# Patient Record
Sex: FEMALE | Race: WHITE | NOT HISPANIC OR LATINO | Employment: OTHER | URBAN - METROPOLITAN AREA
[De-identification: names, ages, dates, MRNs, and addresses within clinical notes are randomized per-mention and may not be internally consistent; named-entity substitution may affect disease eponyms.]

---

## 2018-06-19 ENCOUNTER — HOSPITAL ENCOUNTER (OUTPATIENT)
Dept: FAMILY MEDICINE CLINIC | Facility: CLINIC | Age: 63
Setting detail: SPECIMEN
Discharge: HOME | End: 2018-06-19
Attending: INTERNAL MEDICINE | Admitting: INTERNAL MEDICINE

## 2018-06-19 LAB
BACTERIA SPEC AEROBE CULT: NORMAL
Lab: NORMAL
MICRO REPORT STATUS: NORMAL
SPECIMEN SOURCE: NORMAL

## 2019-01-04 ENCOUNTER — HOSPITAL ENCOUNTER (OUTPATIENT)
Dept: FAMILY MEDICINE CLINIC | Facility: CLINIC | Age: 64
Setting detail: SPECIMEN
Discharge: HOME | End: 2019-01-04
Attending: INTERNAL MEDICINE | Admitting: INTERNAL MEDICINE

## 2019-10-18 ENCOUNTER — OFFICE VISIT (OUTPATIENT)
Dept: FAMILY MEDICINE CLINIC | Facility: CLINIC | Age: 64
End: 2019-10-18

## 2019-10-18 VITALS
HEIGHT: 61 IN | RESPIRATION RATE: 16 BRPM | TEMPERATURE: 100 F | SYSTOLIC BLOOD PRESSURE: 118 MMHG | BODY MASS INDEX: 28.66 KG/M2 | HEART RATE: 86 BPM | WEIGHT: 151.8 LBS | OXYGEN SATURATION: 100 % | DIASTOLIC BLOOD PRESSURE: 66 MMHG

## 2019-10-18 DIAGNOSIS — R05.9 COUGH: ICD-10-CM

## 2019-10-18 DIAGNOSIS — R63.5 WEIGHT GAIN: ICD-10-CM

## 2019-10-18 DIAGNOSIS — M77.9 BONE SPUR: ICD-10-CM

## 2019-10-18 DIAGNOSIS — Z23 NEED FOR IMMUNIZATION AGAINST INFLUENZA: Primary | ICD-10-CM

## 2019-10-18 PROCEDURE — 99214 OFFICE O/P EST MOD 30 MIN: CPT | Performed by: INTERNAL MEDICINE

## 2019-10-18 PROCEDURE — 90471 IMMUNIZATION ADMIN: CPT | Performed by: INTERNAL MEDICINE

## 2019-10-18 PROCEDURE — 90674 CCIIV4 VAC NO PRSV 0.5 ML IM: CPT | Performed by: INTERNAL MEDICINE

## 2019-10-18 NOTE — PROGRESS NOTES
"Rooming Tab(CC,VS,Pt Hx,Fall Screen)  Chief Complaint   Patient presents with   • Foot Pain   • Hearing Loss       Subjective   Pt here for several things  Had URI last month- still with a few coughing here and there- on mucinex. And helping  Needs flu vaccine today  3. Has hearing issues- seen at [facility] and told left ear- thinks may be wax- however  Has sclerosis too and needs hearing test  4. Feet hurt when starts to walk again-   I have reviewed and updated her medications, medical history and problem list during today's office visit.     Patient Care Team:  Cherry Yadav MD as PCP - General (Internal Medicine)    Problem List Tab  Medications Tab  Synopsis Tab  Chart Review Tab  Care Everywhere Tab  Immunizations Tab  Patient History Tab    Social History     Tobacco Use   • Smoking status: Never Smoker   • Smokeless tobacco: Never Used   Substance Use Topics   • Alcohol use: Not on file       Review of Systems   HENT: Positive for hearing loss and sinus pressure.    Respiratory: Positive for cough.    Cardiovascular: Negative for palpitations.   Gastrointestinal: Negative for abdominal distention.   Musculoskeletal: Positive for gait problem.   Neurological: Positive for weakness.       Objective     Rooming Tab(CC,VS,Pt Hx,Fall Screen)  /66 (BP Location: Right arm, Patient Position: Sitting, Cuff Size: Adult)   Pulse 86   Temp 100 °F (37.8 °C) (Oral)   Resp 16   Ht 154.9 cm (61\")   Wt 68.9 kg (151 lb 12.8 oz)   SpO2 100%   BMI 28.68 kg/m²     Body mass index is 28.68 kg/m².    Physical Exam   Constitutional: She is oriented to person, place, and time. She appears well-developed and well-nourished.   HENT:   Head: Normocephalic.   PND   Cardiovascular: Normal rate and regular rhythm.   No murmur heard.  Pulmonary/Chest: Effort normal and breath sounds normal. No respiratory distress.   Musculoskeletal:    Ball bone spur   Neurological: She is alert and oriented to person, place, and " time.   Nursing note and vitals reviewed.       Statin Choice Calculator  Data Reviewed:                   Assessment/Plan   Order Review Tab  Health Maintenance Tab  Patient Plan/Order Tab  Diagnoses and all orders for this visit:    1. Need for immunization against influenza (Primary)  -     Flucelvax Quad=>4Years (PFS); Standing  -     Flucelvax Quad=>4Years (PFS)    2. Weight gain  Comments:  work on increased steps    3. Bone spur  Comments:  needs good shoes with good inserts at all times    4. Cough  Comments:  supportive care        Wrapup Tab  Return in about 1 year (around 10/18/2020), or  for 1/2020, for Annual physical.       They were informed of the diagnosis and treatment plan and directed to f/u for any further problems or concerns.

## 2020-09-09 ENCOUNTER — TELEPHONE (OUTPATIENT)
Dept: FAMILY MEDICINE CLINIC | Facility: CLINIC | Age: 65
End: 2020-09-09

## 2020-09-09 NOTE — TELEPHONE ENCOUNTER
PATIENT IS CALLING IN SHE IS TRYING TO GET SOCIAL SECURITY AND THEY ARE TELLING HER SHE NEEDS TO GET HER MEDICAL RECORDS IN A SEALED ENVELOPE SO SHE CAN TAKE IT TO THEM.      SHE WOULD LIKE CALLBACK TO FIND OUT HOW TO GO ABOUT DOING THIS.    CALLBACK NUMBER IS:  [phone]

## 2020-09-17 NOTE — TELEPHONE ENCOUNTER
Patient returned call to massimo to give more information. attempted to warm transfer and was unsuccessful     Please advise     [phone]

## 2020-09-17 NOTE — TELEPHONE ENCOUNTER
NEEDS AN OFFICIAL COPY OF A MEDICAL RECORD PLACED IN A SEALED ENVELOPE.  WOULD LIKE TO  TOMORROW.  CALL WHEN READY

## 2020-09-18 NOTE — TELEPHONE ENCOUNTER
Records printed.   Release ID: [id number]    Ready for .  Placed up front.  Patient notified they are ready.

## 2020-10-07 ENCOUNTER — LAB (OUTPATIENT)
Dept: FAMILY MEDICINE CLINIC | Facility: CLINIC | Age: 65
End: 2020-10-07

## 2020-10-07 ENCOUNTER — OFFICE VISIT (OUTPATIENT)
Dept: FAMILY MEDICINE CLINIC | Facility: CLINIC | Age: 65
End: 2020-10-07

## 2020-10-07 VITALS
DIASTOLIC BLOOD PRESSURE: 80 MMHG | WEIGHT: 147 LBS | BODY MASS INDEX: 27.75 KG/M2 | OXYGEN SATURATION: 98 % | HEART RATE: 80 BPM | HEIGHT: 61 IN | TEMPERATURE: 97.3 F | SYSTOLIC BLOOD PRESSURE: 124 MMHG

## 2020-10-07 DIAGNOSIS — E55.9 VITAMIN D DEFICIENCY: ICD-10-CM

## 2020-10-07 DIAGNOSIS — Z00.00 ENCOUNTER FOR SCREENING AND PREVENTATIVE CARE: ICD-10-CM

## 2020-10-07 DIAGNOSIS — H61.21 IMPACTED CERUMEN OF RIGHT EAR: ICD-10-CM

## 2020-10-07 DIAGNOSIS — Z12.31 ENCOUNTER FOR SCREENING MAMMOGRAM FOR MALIGNANT NEOPLASM OF BREAST: Primary | ICD-10-CM

## 2020-10-07 DIAGNOSIS — Z78.0 MENOPAUSE: ICD-10-CM

## 2020-10-07 DIAGNOSIS — Z12.11 SCREENING FOR COLON CANCER: ICD-10-CM

## 2020-10-07 PROCEDURE — 80053 COMPREHEN METABOLIC PANEL: CPT | Performed by: INTERNAL MEDICINE

## 2020-10-07 PROCEDURE — 84443 ASSAY THYROID STIM HORMONE: CPT | Performed by: INTERNAL MEDICINE

## 2020-10-07 PROCEDURE — 69210 REMOVE IMPACTED EAR WAX UNI: CPT | Performed by: INTERNAL MEDICINE

## 2020-10-07 PROCEDURE — 90686 IIV4 VACC NO PRSV 0.5 ML IM: CPT | Performed by: INTERNAL MEDICINE

## 2020-10-07 PROCEDURE — 82306 VITAMIN D 25 HYDROXY: CPT | Performed by: INTERNAL MEDICINE

## 2020-10-07 PROCEDURE — 80061 LIPID PANEL: CPT | Performed by: INTERNAL MEDICINE

## 2020-10-07 PROCEDURE — 99396 PREV VISIT EST AGE 40-64: CPT | Performed by: INTERNAL MEDICINE

## 2020-10-07 PROCEDURE — 85025 COMPLETE CBC W/AUTO DIFF WBC: CPT | Performed by: INTERNAL MEDICINE

## 2020-10-07 PROCEDURE — 90471 IMMUNIZATION ADMIN: CPT | Performed by: INTERNAL MEDICINE

## 2020-10-07 NOTE — PROGRESS NOTES
"Rooming Tab(CC,VS,Pt Hx,Fall Screen)  Chief Complaint   Patient presents with   • Annual Exam       Subjective   Pt here for annual exam. Trying to lose weight last 3 months down 5 lbs. Still caring for parents- (only mom now as dad  in March) down for 2 weeks then home for 2 weeks.   Mom doing better- blind and hard to get around. Brother now executor to get estate under control.    Sleeping fair- at home. No chest pain  No difficulty breathing, no GERD   joints and back ok-  vacuum  very heavy so took in back   I have reviewed and updated her medications, medical history and problem list during today's office visit.     Patient Care Team:  Cherry Yadav MD as PCP - General (Internal Medicine)    Problem List Tab  Medications Tab  Synopsis Tab  Chart Review Tab  Care Everywhere Tab  Immunizations Tab  Patient History Tab    Social History     Tobacco Use   • Smoking status: Never Smoker   • Smokeless tobacco: Never Used   Substance Use Topics   • Alcohol use: Not on file       Review of Systems   Constitutional: Positive for unexpected weight gain. Negative for fatigue.   Cardiovascular: Negative for chest pain.   Musculoskeletal: Positive for arthralgias.   Psychiatric/Behavioral: Positive for sleep disturbance. Negative for stress.       Objective     Rooming Tab(CC,VS,Pt Hx,Fall Screen)  /80 (BP Location: Left arm, Patient Position: Sitting, Cuff Size: Adult)   Pulse 80   Temp 97.3 °F (36.3 °C) (Temporal)   Ht 154.9 cm (61\")   Wt 66.7 kg (147 lb)   SpO2 98%   BMI 27.78 kg/m²     Body mass index is 27.78 kg/m².    Physical Exam  Vitals signs and nursing note reviewed.   Constitutional:       Appearance: Normal appearance. She is well-developed. She is obese.   HENT:      Head: Normocephalic and atraumatic.      Right Ear: Tympanic membrane normal.      Left Ear: Tympanic membrane normal.   Eyes:      Pupils: Pupils are equal, round, and reactive to light.   Neck:      " Musculoskeletal: Normal range of motion and neck supple.   Cardiovascular:      Rate and Rhythm: Normal rate and regular rhythm.      Heart sounds: No murmur.   Pulmonary:      Effort: Pulmonary effort is normal.      Breath sounds: Normal breath sounds.   Chest:      Breasts:         Right: No inverted nipple or mass.         Left: No inverted nipple or mass.   Abdominal:      General: Bowel sounds are normal. There is no distension.      Palpations: Abdomen is soft.   Skin:     Capillary Refill: Capillary refill takes less than 2 seconds.   Neurological:      Mental Status: She is alert and oriented to person, place, and time.          Statin Choice Calculator  Data Reviewed:     .Ear Cerumen Removal    Date/Time: 10/7/2020 2:14 PM  Performed by: Cherry Yadav MD  Authorized by: Cherry Yadav MD     Anesthesia:  Local Anesthetic: none  Location details: right ear  Patient tolerance: patient tolerated the procedure well with no immediate complications  Procedure type: instrumentation and irrigation   Sedation:  Patient sedated: no                  Lab Results   Component Value Date    BUN 19 10/07/2020    CREATININE 0.65 10/07/2020    EGFRIFNONA 92 10/07/2020     10/07/2020    K 4.1 10/07/2020     10/07/2020    CALCIUM 9.7 10/07/2020    ALBUMIN 4.40 10/07/2020    BILITOT 0.4 10/07/2020    ALKPHOS 69 10/07/2020    AST 18 10/07/2020    ALT 13 10/07/2020    TRIG 80 10/07/2020    HDL 56 10/07/2020    VLDL 16 10/07/2020     (H) 10/07/2020    LDLHDL 2.54 10/07/2020    WBC 6.75 10/07/2020    RBC 4.57 10/07/2020    HCT 40.5 10/07/2020    MCV 88.6 10/07/2020    MCH 29.1 10/07/2020    TSH 2.270 10/07/2020    SANH85HW 57.0 10/07/2020      Assessment/Plan   Order Review Tab  Health Maintenance Tab  Patient Plan/Order Tab  Diagnoses and all orders for this visit:    1. Encounter for screening mammogram for malignant neoplasm of breast (Primary)  Comments:  discussed all  recommendations  Orders:  -     Mammo Screening Digital Tomosynthesis Bilateral With CAD; Future    2. Screening for colon cancer  -     Ambulatory Referral For Screening Colonoscopy    3. Menopause  -     DEXA Bone Density Axial; Future    4. Encounter for screening and preventative care  Comments:  discussed all recommendations  Orders:  -     TSH  -     Lipid Panel  -     Comprehensive Metabolic Panel  -     CBC Auto Differential    5. Vitamin D deficiency  -     Vitamin D 25 Hydroxy    6. Impacted cerumen of right ear  Comments:  right ear   Orders:  -     Ear Cerumen Removal    Other orders  -     FluLaval Quad >6 Months ([id number])        Wrapup Tab  Return in about 6 months (around 4/7/2021), or if symptoms worsen or fail to improve.       During this visit for their annual exam, we reviewed their personal history, social history and family history.  We went over their medications and all the recommended health maintenence items for their age group. They were given the opportunity to ask questions and discuss other concerns.

## 2020-10-08 LAB
25(OH)D3 SERPL-MCNC: 57 NG/ML (ref 30–100)
ALBUMIN SERPL-MCNC: 4.4 G/DL (ref 3.5–5.2)
ALBUMIN/GLOB SERPL: 1.5 G/DL
ALP SERPL-CCNC: 69 U/L (ref 39–117)
ALT SERPL W P-5'-P-CCNC: 13 U/L (ref 1–33)
ANION GAP SERPL CALCULATED.3IONS-SCNC: 11.4 MMOL/L (ref 5–15)
AST SERPL-CCNC: 18 U/L (ref 1–32)
BASOPHILS # BLD AUTO: 0.04 10*3/MM3 (ref 0–0.2)
BASOPHILS NFR BLD AUTO: 0.6 % (ref 0–1.5)
BILIRUB SERPL-MCNC: 0.4 MG/DL (ref 0–1.2)
BUN SERPL-MCNC: 19 MG/DL (ref 8–23)
BUN/CREAT SERPL: 29.2 (ref 7–25)
CALCIUM SPEC-SCNC: 9.7 MG/DL (ref 8.6–10.5)
CHLORIDE SERPL-SCNC: 104 MMOL/L (ref 98–107)
CHOLEST SERPL-MCNC: 214 MG/DL (ref 0–200)
CO2 SERPL-SCNC: 26.6 MMOL/L (ref 22–29)
CREAT SERPL-MCNC: 0.65 MG/DL (ref 0.57–1)
DEPRECATED RDW RBC AUTO: 41.7 FL (ref 37–54)
EOSINOPHIL # BLD AUTO: 0.1 10*3/MM3 (ref 0–0.4)
EOSINOPHIL NFR BLD AUTO: 1.5 % (ref 0.3–6.2)
ERYTHROCYTE [DISTWIDTH] IN BLOOD BY AUTOMATED COUNT: 13 % (ref 12.3–15.4)
GFR SERPL CREATININE-BSD FRML MDRD: 92 ML/MIN/1.73
GLOBULIN UR ELPH-MCNC: 3 GM/DL
GLUCOSE SERPL-MCNC: 91 MG/DL (ref 65–99)
HCT VFR BLD AUTO: 40.5 % (ref 34–46.6)
HDLC SERPL-MCNC: 56 MG/DL (ref 40–60)
HGB BLD-MCNC: 13.3 G/DL (ref 12–15.9)
IMM GRANULOCYTES # BLD AUTO: 0.01 10*3/MM3 (ref 0–0.05)
IMM GRANULOCYTES NFR BLD AUTO: 0.1 % (ref 0–0.5)
LDLC SERPL CALC-MCNC: 142 MG/DL (ref 0–100)
LDLC/HDLC SERPL: 2.54 {RATIO}
LYMPHOCYTES # BLD AUTO: 2.74 10*3/MM3 (ref 0.7–3.1)
LYMPHOCYTES NFR BLD AUTO: 40.6 % (ref 19.6–45.3)
MCH RBC QN AUTO: 29.1 PG (ref 26.6–33)
MCHC RBC AUTO-ENTMCNC: 32.8 G/DL (ref 31.5–35.7)
MCV RBC AUTO: 88.6 FL (ref 79–97)
MONOCYTES # BLD AUTO: 0.37 10*3/MM3 (ref 0.1–0.9)
MONOCYTES NFR BLD AUTO: 5.5 % (ref 5–12)
NEUTROPHILS NFR BLD AUTO: 3.49 10*3/MM3 (ref 1.7–7)
NEUTROPHILS NFR BLD AUTO: 51.7 % (ref 42.7–76)
NRBC BLD AUTO-RTO: 0 /100 WBC (ref 0–0.2)
PLATELET # BLD AUTO: 262 10*3/MM3 (ref 140–450)
PMV BLD AUTO: 10.6 FL (ref 6–12)
POTASSIUM SERPL-SCNC: 4.1 MMOL/L (ref 3.5–5.2)
PROT SERPL-MCNC: 7.4 G/DL (ref 6–8.5)
RBC # BLD AUTO: 4.57 10*6/MM3 (ref 3.77–5.28)
SODIUM SERPL-SCNC: 142 MMOL/L (ref 136–145)
TRIGL SERPL-MCNC: 80 MG/DL (ref 0–150)
TSH SERPL DL<=0.05 MIU/L-ACNC: 2.27 UIU/ML (ref 0.27–4.2)
VLDLC SERPL-MCNC: 16 MG/DL (ref 5–40)
WBC # BLD AUTO: 6.75 10*3/MM3 (ref 3.4–10.8)

## 2020-10-09 NOTE — PROGRESS NOTES
Please call the patient regarding her abnormal result. Has borderline cholesterol everything else is great- just watch diet when going back and forth from mom's to her house

## 2020-10-10 PROBLEM — Z12.31 ENCOUNTER FOR SCREENING MAMMOGRAM FOR MALIGNANT NEOPLASM OF BREAST: Status: ACTIVE | Noted: 2020-10-10

## 2020-10-10 PROBLEM — Z00.00 ENCOUNTER FOR SCREENING AND PREVENTATIVE CARE: Status: ACTIVE | Noted: 2020-10-10

## 2020-10-10 PROBLEM — Z12.11 SCREENING FOR COLON CANCER: Status: ACTIVE | Noted: 2020-10-10

## 2020-10-10 PROBLEM — H61.21 IMPACTED CERUMEN OF RIGHT EAR: Status: ACTIVE | Noted: 2020-10-10

## 2020-10-10 PROBLEM — Z78.0 MENOPAUSE: Status: ACTIVE | Noted: 2020-10-10

## 2020-10-10 PROBLEM — E55.9 VITAMIN D DEFICIENCY: Status: ACTIVE | Noted: 2020-10-10

## 2020-12-09 ENCOUNTER — ON CAMPUS - OUTPATIENT (AMBULATORY)
Dept: URBAN - METROPOLITAN AREA HOSPITAL 2 | Facility: HOSPITAL | Age: 65
End: 2020-12-09

## 2020-12-09 VITALS
SYSTOLIC BLOOD PRESSURE: 66 MMHG | SYSTOLIC BLOOD PRESSURE: 140 MMHG | HEIGHT: 61 IN | OXYGEN SATURATION: 98 % | TEMPERATURE: 94.2 F | DIASTOLIC BLOOD PRESSURE: 59 MMHG | DIASTOLIC BLOOD PRESSURE: 56 MMHG | DIASTOLIC BLOOD PRESSURE: 79 MMHG | HEART RATE: 74 BPM | OXYGEN SATURATION: 100 % | HEART RATE: 70 BPM | SYSTOLIC BLOOD PRESSURE: 141 MMHG | SYSTOLIC BLOOD PRESSURE: 116 MMHG | RESPIRATION RATE: 16 BRPM | SYSTOLIC BLOOD PRESSURE: 89 MMHG | SYSTOLIC BLOOD PRESSURE: 103 MMHG | DIASTOLIC BLOOD PRESSURE: 73 MMHG | HEART RATE: 78 BPM | DIASTOLIC BLOOD PRESSURE: 42 MMHG | DIASTOLIC BLOOD PRESSURE: 70 MMHG | SYSTOLIC BLOOD PRESSURE: 126 MMHG | HEART RATE: 72 BPM | HEART RATE: 67 BPM | RESPIRATION RATE: 18 BRPM | HEART RATE: 69 BPM | RESPIRATION RATE: 17 BRPM | OXYGEN SATURATION: 99 % | DIASTOLIC BLOOD PRESSURE: 60 MMHG | DIASTOLIC BLOOD PRESSURE: 69 MMHG | DIASTOLIC BLOOD PRESSURE: 51 MMHG | HEART RATE: 80 BPM | WEIGHT: 147 LBS | SYSTOLIC BLOOD PRESSURE: 142 MMHG | HEART RATE: 76 BPM | HEART RATE: 73 BPM

## 2020-12-09 DIAGNOSIS — K63.5 POLYP OF COLON: ICD-10-CM

## 2020-12-09 DIAGNOSIS — Z12.11 ENCOUNTER FOR SCREENING FOR MALIGNANT NEOPLASM OF COLON: ICD-10-CM

## 2020-12-09 LAB
GI HISTOLOGY: A. UNSPECIFIED: (no result)
GI HISTOLOGY: PDF REPORT: (no result)

## 2020-12-09 PROCEDURE — 45385 COLONOSCOPY W/LESION REMOVAL: CPT | Mod: 33 | Performed by: INTERNAL MEDICINE

## 2021-05-13 ENCOUNTER — TELEPHONE (OUTPATIENT)
Dept: FAMILY MEDICINE CLINIC | Facility: CLINIC | Age: 66
End: 2021-05-13

## 2021-05-13 NOTE — TELEPHONE ENCOUNTER
Caller: Nya Hardin    Relationship: Self    Best call back number: [phone]    What is the medical concern/diagnosis: HEARING PROBLEMS    What specialty or service is being requested: ENT      Any additional details: NYA STATED THIS WAS DISCUSSED AT A PREVIOUS APPOINTMENT BUT SHE NEVER SCHEDULED. PATIENT REQUESTED A CALL TO ADVISE.

## 2021-05-14 NOTE — TELEPHONE ENCOUNTER
She was supposed to f/u with [facility] in April for 6 month f/u. She needs to be scheduled and we will put the ENT referral in

## 2021-05-14 NOTE — TELEPHONE ENCOUNTER
PATIENT IS ALREADY SCHEDULED 6/22/21 SO I LEFT HER A VM LETTING HER KNOW THEY CAN DISCUSS THE REFERRAL AT THAT APPT

## 2021-06-18 ENCOUNTER — TELEPHONE (OUTPATIENT)
Dept: FAMILY MEDICINE CLINIC | Facility: CLINIC | Age: 66
End: 2021-06-18

## 2021-06-18 NOTE — TELEPHONE ENCOUNTER
PATIENT WOULD LIKE TO KNOW IF SHE WILL BE COMPLETING BLOOD WORK AT HER 6/22 APPOINTMENT AND IF SO SHOULD SHE FAST.    PLEASE ADVISE  [phone]

## 2021-06-22 ENCOUNTER — LAB (OUTPATIENT)
Dept: FAMILY MEDICINE CLINIC | Facility: CLINIC | Age: 66
End: 2021-06-22

## 2021-06-22 ENCOUNTER — OFFICE VISIT (OUTPATIENT)
Dept: FAMILY MEDICINE CLINIC | Facility: CLINIC | Age: 66
End: 2021-06-22

## 2021-06-22 VITALS
HEIGHT: 61 IN | SYSTOLIC BLOOD PRESSURE: 122 MMHG | WEIGHT: 148.2 LBS | DIASTOLIC BLOOD PRESSURE: 71 MMHG | HEART RATE: 71 BPM | RESPIRATION RATE: 10 BRPM | BODY MASS INDEX: 27.98 KG/M2 | OXYGEN SATURATION: 97 %

## 2021-06-22 DIAGNOSIS — H80.90 OTOSCLEROSIS, UNSPECIFIED LATERALITY: Primary | ICD-10-CM

## 2021-06-22 DIAGNOSIS — E78.41 ELEVATED LIPOPROTEIN(A): ICD-10-CM

## 2021-06-22 DIAGNOSIS — E55.9 VITAMIN D DEFICIENCY: ICD-10-CM

## 2021-06-22 PROBLEM — R05.9 COUGH: Status: RESOLVED | Noted: 2019-10-18 | Resolved: 2021-06-22

## 2021-06-22 PROBLEM — H61.21 IMPACTED CERUMEN OF RIGHT EAR: Status: RESOLVED | Noted: 2020-10-10 | Resolved: 2021-06-22

## 2021-06-22 PROCEDURE — 99213 OFFICE O/P EST LOW 20 MIN: CPT | Performed by: INTERNAL MEDICINE

## 2021-06-22 PROCEDURE — 36415 COLL VENOUS BLD VENIPUNCTURE: CPT | Performed by: INTERNAL MEDICINE

## 2021-06-22 PROCEDURE — 80061 LIPID PANEL: CPT | Performed by: INTERNAL MEDICINE

## 2021-06-22 PROCEDURE — 80053 COMPREHEN METABOLIC PANEL: CPT | Performed by: INTERNAL MEDICINE

## 2021-06-22 PROCEDURE — 82306 VITAMIN D 25 HYDROXY: CPT | Performed by: INTERNAL MEDICINE
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
## 2021-06-23 LAB
25(OH)D3 SERPL-MCNC: 46 NG/ML
ALBUMIN SERPL-MCNC: 4.4 G/DL (ref 3.5–5.2)
ALBUMIN/GLOB SERPL: 1.4 G/DL
ALP SERPL-CCNC: 66 U/L (ref 39–117)
ALT SERPL W P-5'-P-CCNC: 13 U/L (ref 1–33)
ANION GAP SERPL CALCULATED.3IONS-SCNC: 8 MMOL/L (ref 5–15)
AST SERPL-CCNC: 18 U/L (ref 1–32)
BILIRUB SERPL-MCNC: 0.5 MG/DL (ref 0–1.2)
BUN SERPL-MCNC: 15 MG/DL (ref 8–23)
BUN/CREAT SERPL: 21.1 (ref 7–25)
CALCIUM SPEC-SCNC: 9.8 MG/DL (ref 8.6–10.5)
CHLORIDE SERPL-SCNC: 104 MMOL/L (ref 98–107)
CHOLEST SERPL-MCNC: 271 MG/DL (ref 0–200)
CO2 SERPL-SCNC: 27 MMOL/L (ref 22–29)
CREAT SERPL-MCNC: 0.71 MG/DL (ref 0.57–1)
GFR SERPL CREATININE-BSD FRML MDRD: 83 ML/MIN/1.73
GLOBULIN UR ELPH-MCNC: 3.1 GM/DL
GLUCOSE SERPL-MCNC: 82 MG/DL (ref 65–99)
HDLC SERPL-MCNC: 65 MG/DL (ref 40–60)
LDLC SERPL CALC-MCNC: 192 MG/DL (ref 0–100)
LDLC/HDLC SERPL: 2.92 {RATIO}
POTASSIUM SERPL-SCNC: 4.7 MMOL/L (ref 3.5–5.2)
PROT SERPL-MCNC: 7.5 G/DL (ref 6–8.5)
SODIUM SERPL-SCNC: 139 MMOL/L (ref 136–145)
TRIGL SERPL-MCNC: 82 MG/DL (ref 0–150)
VLDLC SERPL-MCNC: 14 MG/DL (ref 5–40)

## 2021-06-23 RX ORDER — ATORVASTATIN CALCIUM 20 MG/1
20 TABLET, FILM COATED ORAL DAILY
Qty: 90 TABLET | Refills: 1 | Status: SHIPPED | OUTPATIENT
Start: 2021-06-23 | End: 2022-07-07

## 2021-06-23 NOTE — PROGRESS NOTES
Please call the patient regarding her abnormal result.cholesterol is up over 50 points- sent in Rx for meds and needs repeated in 3 months fasting cmp and lipid

## 2021-08-23 ENCOUNTER — TELEPHONE (OUTPATIENT)
Dept: FAMILY MEDICINE CLINIC | Facility: CLINIC | Age: 66
End: 2021-08-23

## 2021-08-23 NOTE — TELEPHONE ENCOUNTER
No- as long not hurting her, would let it be. If getting stuck and causing discomfort- would go to hand surgery- and will need injections or when severe, surgery

## 2021-08-23 NOTE — TELEPHONE ENCOUNTER
Caller: Ave Hardin    Relationship: Self    Best call back number: [phone]    What is the best time to reach you: ANY    Who are you requesting to speak with (clinical staff, provider,  specific staff member): CLINICAL STAFF  Do you know the name of the person who called:     What was the call regarding: WOULD LIKE TO KNOW IF SHE NEEDS AN APPOINTMENT, STATED SHE HAS A CLICKING IN HER FINGER, LOOKED IT UP AND IT IS CALLED TRIGGER FINGER .     Do you require a callback: YES

## 2021-08-25 NOTE — TELEPHONE ENCOUNTER
Left detailed voicemail for Ave Hardin as per verbal release. Advised Ave Hardin to call the office with any additional questions.

## 2021-09-16 DIAGNOSIS — E78.41 ELEVATED LIPOPROTEIN(A): Primary | ICD-10-CM

## 2021-09-17 ENCOUNTER — TELEPHONE (OUTPATIENT)
Dept: FAMILY MEDICINE CLINIC | Facility: CLINIC | Age: 66
End: 2021-09-17

## 2021-09-17 ENCOUNTER — LAB (OUTPATIENT)
Dept: FAMILY MEDICINE CLINIC | Facility: CLINIC | Age: 66
End: 2021-09-17

## 2021-09-17 DIAGNOSIS — E78.41 ELEVATED LIPOPROTEIN(A): ICD-10-CM

## 2021-09-17 PROCEDURE — 36415 COLL VENOUS BLD VENIPUNCTURE: CPT

## 2021-09-17 PROCEDURE — 80061 LIPID PANEL: CPT | Performed by: INTERNAL MEDICINE

## 2021-09-17 PROCEDURE — 80053 COMPREHEN METABOLIC PANEL: CPT | Performed by: INTERNAL MEDICINE

## 2021-09-17 NOTE — TELEPHONE ENCOUNTER
Patient was in for labs today (9/17/21). Patient states that she is going to hold off starting cholesterol meds at this point. Patient states that she is going to try diet and exercise first and see if that helps. Patient states that she is down 7.8 lbs since last visit.

## 2021-09-18 LAB
ALBUMIN SERPL-MCNC: 4.5 G/DL (ref 3.5–5.2)
ALBUMIN/GLOB SERPL: 1.7 G/DL
ALP SERPL-CCNC: 58 U/L (ref 39–117)
ALT SERPL W P-5'-P-CCNC: 15 U/L (ref 1–33)
ANION GAP SERPL CALCULATED.3IONS-SCNC: 14.1 MMOL/L (ref 5–15)
AST SERPL-CCNC: 19 U/L (ref 1–32)
BILIRUB SERPL-MCNC: 0.5 MG/DL (ref 0–1.2)
BUN SERPL-MCNC: 16 MG/DL (ref 8–23)
BUN/CREAT SERPL: 26.2 (ref 7–25)
CALCIUM SPEC-SCNC: 9.7 MG/DL (ref 8.6–10.5)
CHLORIDE SERPL-SCNC: 103 MMOL/L (ref 98–107)
CHOLEST SERPL-MCNC: 223 MG/DL (ref 0–200)
CO2 SERPL-SCNC: 24.9 MMOL/L (ref 22–29)
CREAT SERPL-MCNC: 0.61 MG/DL (ref 0.57–1)
GFR SERPL CREATININE-BSD FRML MDRD: 99 ML/MIN/1.73
GLOBULIN UR ELPH-MCNC: 2.6 GM/DL
GLUCOSE SERPL-MCNC: 89 MG/DL (ref 65–99)
HDLC SERPL-MCNC: 60 MG/DL (ref 40–60)
LDLC SERPL CALC-MCNC: 151 MG/DL (ref 0–100)
LDLC/HDLC SERPL: 2.49 {RATIO}
POTASSIUM SERPL-SCNC: 3.8 MMOL/L (ref 3.5–5.2)
PROT SERPL-MCNC: 7.1 G/DL (ref 6–8.5)
SODIUM SERPL-SCNC: 142 MMOL/L (ref 136–145)
TRIGL SERPL-MCNC: 69 MG/DL (ref 0–150)
VLDLC SERPL-MCNC: 12 MG/DL (ref 5–40)

## 2022-03-24 ENCOUNTER — TELEPHONE (OUTPATIENT)
Dept: FAMILY MEDICINE CLINIC | Facility: CLINIC | Age: 67
End: 2022-03-24

## 2022-03-24 DIAGNOSIS — Z00.00 ENCOUNTER FOR SCREENING AND PREVENTATIVE CARE: ICD-10-CM

## 2022-03-24 DIAGNOSIS — E78.41 ELEVATED LIPOPROTEIN(A): Primary | ICD-10-CM

## 2022-03-24 DIAGNOSIS — E55.9 VITAMIN D DEFICIENCY: ICD-10-CM

## 2022-03-24 NOTE — TELEPHONE ENCOUNTER
Caller: Ave Hardin    Relationship to patient: Self    Best call back number: [phone]    Patient is needing: PATIENT CALLED TO SCHEDULE A WELCOME TO MEDICARE VISIT WITH DR. HUNT IN July 2022     SHE SAID SHE WAS TOLD RECENTLY BY DR. HUNT THAT SHE HAD HIGH CHOLESTEROL AND WAS NOT ON MEDICATION FOR IT    SHE WAS WANTING TO SEE IF DR. HUNT WANTED HER TO COME IN TO HAVE HER CHOLESTEROL CHECKED BEFORE THE APPOINTMENT, AND IF SO, HOW FAR IN ADVANCE

## 2022-07-05 ENCOUNTER — LAB (OUTPATIENT)
Dept: FAMILY MEDICINE CLINIC | Facility: CLINIC | Age: 67
End: 2022-07-05

## 2022-07-05 PROCEDURE — 85025 COMPLETE CBC W/AUTO DIFF WBC: CPT | Performed by: INTERNAL MEDICINE

## 2022-07-05 PROCEDURE — 80061 LIPID PANEL: CPT | Performed by: INTERNAL MEDICINE

## 2022-07-05 PROCEDURE — 82306 VITAMIN D 25 HYDROXY: CPT | Performed by: INTERNAL MEDICINE

## 2022-07-05 PROCEDURE — 80053 COMPREHEN METABOLIC PANEL: CPT | Performed by: INTERNAL MEDICINE

## 2022-07-05 PROCEDURE — 84443 ASSAY THYROID STIM HORMONE: CPT | Performed by: INTERNAL MEDICINE

## 2022-07-05 PROCEDURE — 36415 COLL VENOUS BLD VENIPUNCTURE: CPT | Performed by: INTERNAL MEDICINE

## 2022-07-06 ENCOUNTER — TELEPHONE (OUTPATIENT)
Dept: FAMILY MEDICINE CLINIC | Facility: CLINIC | Age: 67
End: 2022-07-06

## 2022-07-06 LAB
25(OH)D3 SERPL-MCNC: 45 NG/ML (ref 30–100)
ALBUMIN SERPL-MCNC: 4.3 G/DL (ref 3.5–5.2)
ALBUMIN/GLOB SERPL: 1.6 G/DL
ALP SERPL-CCNC: 63 U/L (ref 39–117)
ALT SERPL W P-5'-P-CCNC: 16 U/L (ref 1–33)
ANION GAP SERPL CALCULATED.3IONS-SCNC: 10.4 MMOL/L (ref 5–15)
AST SERPL-CCNC: 17 U/L (ref 1–32)
BASOPHILS # BLD AUTO: 0.01 10*3/MM3 (ref 0–0.2)
BASOPHILS NFR BLD AUTO: 0.2 % (ref 0–1.5)
BILIRUB SERPL-MCNC: 0.4 MG/DL (ref 0–1.2)
BUN SERPL-MCNC: 17 MG/DL (ref 8–23)
BUN/CREAT SERPL: 29.3 (ref 7–25)
CALCIUM SPEC-SCNC: 9.5 MG/DL (ref 8.6–10.5)
CHLORIDE SERPL-SCNC: 101 MMOL/L (ref 98–107)
CHOLEST SERPL-MCNC: 220 MG/DL (ref 0–200)
CO2 SERPL-SCNC: 25.6 MMOL/L (ref 22–29)
CREAT SERPL-MCNC: 0.58 MG/DL (ref 0.57–1)
DEPRECATED RDW RBC AUTO: 44.3 FL (ref 37–54)
EGFRCR SERPLBLD CKD-EPI 2021: 100.6 ML/MIN/1.73
EOSINOPHIL # BLD AUTO: 0.08 10*3/MM3 (ref 0–0.4)
EOSINOPHIL NFR BLD AUTO: 1.4 % (ref 0.3–6.2)
ERYTHROCYTE [DISTWIDTH] IN BLOOD BY AUTOMATED COUNT: 13.4 % (ref 12.3–15.4)
GLOBULIN UR ELPH-MCNC: 2.7 GM/DL
GLUCOSE SERPL-MCNC: 75 MG/DL (ref 65–99)
HCT VFR BLD AUTO: 43.6 % (ref 34–46.6)
HDLC SERPL-MCNC: 63 MG/DL (ref 40–60)
HGB BLD-MCNC: 13.8 G/DL (ref 12–15.9)
IMM GRANULOCYTES # BLD AUTO: 0.01 10*3/MM3 (ref 0–0.05)
IMM GRANULOCYTES NFR BLD AUTO: 0.2 % (ref 0–0.5)
LDLC SERPL CALC-MCNC: 145 MG/DL (ref 0–100)
LDLC/HDLC SERPL: 2.28 {RATIO}
LYMPHOCYTES # BLD AUTO: 2.48 10*3/MM3 (ref 0.7–3.1)
LYMPHOCYTES NFR BLD AUTO: 42.2 % (ref 19.6–45.3)
MCH RBC QN AUTO: 28.9 PG (ref 26.6–33)
MCHC RBC AUTO-ENTMCNC: 31.7 G/DL (ref 31.5–35.7)
MCV RBC AUTO: 91.4 FL (ref 79–97)
MONOCYTES # BLD AUTO: 0.35 10*3/MM3 (ref 0.1–0.9)
MONOCYTES NFR BLD AUTO: 6 % (ref 5–12)
NEUTROPHILS NFR BLD AUTO: 2.95 10*3/MM3 (ref 1.7–7)
NEUTROPHILS NFR BLD AUTO: 50 % (ref 42.7–76)
NRBC BLD AUTO-RTO: 0 /100 WBC (ref 0–0.2)
PLATELET # BLD AUTO: 246 10*3/MM3 (ref 140–450)
PMV BLD AUTO: 10.3 FL (ref 6–12)
POTASSIUM SERPL-SCNC: 4.1 MMOL/L (ref 3.5–5.2)
PROT SERPL-MCNC: 7 G/DL (ref 6–8.5)
RBC # BLD AUTO: 4.77 10*6/MM3 (ref 3.77–5.28)
SODIUM SERPL-SCNC: 137 MMOL/L (ref 136–145)
TRIGL SERPL-MCNC: 68 MG/DL (ref 0–150)
TSH SERPL DL<=0.05 MIU/L-ACNC: 3.11 UIU/ML (ref 0.27–4.2)
VLDLC SERPL-MCNC: 12 MG/DL (ref 5–40)
WBC NRBC COR # BLD: 5.88 10*3/MM3 (ref 3.4–10.8)

## 2022-07-06 NOTE — TELEPHONE ENCOUNTER
Caller: Ave Hardin    Relationship: Self    Best call back number: [phone]    Caller requesting test results: PATIENT     What test was performed: BLOOD WORK     When was the test performed: 07/06/22    Where was the test performed: OFFICE     Additional notes: PATIENT IS WANTING TO SEE IF LABS ARE BACK YET, WANTING IT PUT INTO MYCHART

## 2022-07-07 ENCOUNTER — TRANSCRIBE ORDERS (OUTPATIENT)
Dept: ADMINISTRATIVE | Facility: HOSPITAL | Age: 67
End: 2022-07-07

## 2022-07-07 ENCOUNTER — LAB (OUTPATIENT)
Dept: FAMILY MEDICINE CLINIC | Facility: CLINIC | Age: 67
End: 2022-07-07

## 2022-07-07 ENCOUNTER — OFFICE VISIT (OUTPATIENT)
Dept: FAMILY MEDICINE CLINIC | Facility: CLINIC | Age: 67
End: 2022-07-07

## 2022-07-07 VITALS
BODY MASS INDEX: 26.47 KG/M2 | RESPIRATION RATE: 16 BRPM | DIASTOLIC BLOOD PRESSURE: 82 MMHG | SYSTOLIC BLOOD PRESSURE: 112 MMHG | WEIGHT: 140.2 LBS | HEIGHT: 61 IN | TEMPERATURE: 97.3 F | HEART RATE: 79 BPM | OXYGEN SATURATION: 97 %

## 2022-07-07 DIAGNOSIS — Z13.9 SCREENING DUE: Primary | ICD-10-CM

## 2022-07-07 DIAGNOSIS — Z12.31 ENCOUNTER FOR SCREENING MAMMOGRAM FOR MALIGNANT NEOPLASM OF BREAST: ICD-10-CM

## 2022-07-07 DIAGNOSIS — E78.41 ELEVATED LIPOPROTEIN(A): ICD-10-CM

## 2022-07-07 DIAGNOSIS — Z00.00 WELCOME TO MEDICARE PREVENTIVE VISIT: ICD-10-CM

## 2022-07-07 DIAGNOSIS — Z00.00 ENCOUNTER FOR GENERAL ADULT MEDICAL EXAMINATION WITHOUT ABNORMAL FINDINGS: Primary | ICD-10-CM

## 2022-07-07 DIAGNOSIS — R30.0 DYSURIA: ICD-10-CM

## 2022-07-07 DIAGNOSIS — E55.9 VITAMIN D DEFICIENCY: ICD-10-CM

## 2022-07-07 DIAGNOSIS — M81.0 AGE-RELATED OSTEOPOROSIS WITHOUT CURRENT PATHOLOGICAL FRACTURE: ICD-10-CM

## 2022-07-07 PROCEDURE — 1160F RVW MEDS BY RX/DR IN RCRD: CPT | Performed by: INTERNAL MEDICINE

## 2022-07-07 PROCEDURE — 1170F FXNL STATUS ASSESSED: CPT | Performed by: INTERNAL MEDICINE

## 2022-07-07 PROCEDURE — 99213 OFFICE O/P EST LOW 20 MIN: CPT | Performed by: INTERNAL MEDICINE

## 2022-07-07 PROCEDURE — G0402 INITIAL PREVENTIVE EXAM: HCPCS | Performed by: INTERNAL MEDICINE

## 2022-07-07 PROCEDURE — 87186 SC STD MICRODIL/AGAR DIL: CPT

## 2022-07-07 PROCEDURE — 87086 URINE CULTURE/COLONY COUNT: CPT | Performed by: INTERNAL MEDICINE

## 2022-07-07 PROCEDURE — 87088 URINE BACTERIA CULTURE: CPT | Performed by: INTERNAL MEDICINE

## 2022-07-07 PROCEDURE — G0403 EKG FOR INITIAL PREVENT EXAM: HCPCS | Performed by: INTERNAL MEDICINE

## 2022-07-07 PROCEDURE — 81001 URINALYSIS AUTO W/SCOPE: CPT | Performed by: INTERNAL MEDICINE

## 2022-07-07 NOTE — PROGRESS NOTES
The ABCs of the Annual Wellness Visit  [place] to [place] Visit    Chief Complaint   Patient presents with   • Welcome To Medicare     Subjective {   History of Present Illness:  Ave Hardin is a 66 y.o. female who presents for a  Welcome to Medicare Visit and other concerns.    Hyperlipidemia  The patient states that she is not taking her cholesterol medication at all. She notes that she is eating a healthier diet. She states that she has been working on her diet and exercise. She states that she has lost 7 to 8 pounds. She states that she is wondering if she should do a coronary calcium scan to see if there is something going on and she needs medication. She states that she does not need any refills.    Vitamin D deficiency  The patients vitamin D levels was within normal limits.     Dysuria  The patient complains of dysuria for 2 or 3 days. She states she has increased her water intake.    Health maintenance  The patient states that she had a colonoscopy in 12/2020. She states that she thinks they found a little polyp. She states that she has not had a new partner in a long time. She states that she has never had an abnormal Pap smear. She states that her last mammogram was in 11/2020. She states that she does not have a bone density test yet. She states that she takes a multi vitamin. She states that she drinks a cup of coffee per day. She states that she has not seen a dermatologist. She states that she tries to do her breast exams. The patient states she often get irritation. She notes it is not scabbed or black in color. She states that she needs a shingles and tetanus vaccine.     The review of systems is negative for chest pain, shortness of breath, headache, vision change, nausea, vomiting, diarrhea, abdominal pain, bowel or bladder problems, significant joint problems, or skin related issues    The following portions of the patient's history were reviewed and   updated as appropriate: allergies,  current medications, past family history, past medical history, past social history, past surgical history and problem list.     Compared to one year ago, the patient feels her physical   health is the same.    Compared to one year ago, the patient feels her mental   health is the same.    Recent Hospitalizations:  She was not admitted to the hospital during the last year.       Current Medical Providers:  Patient Care Team:  Cherry Yadav MD as PCP - General (Internal Medicine)    Outpatient Medications Prior to Visit   Medication Sig Dispense Refill   • atorvastatin (Lipitor) 20 MG tablet Take 1 tablet by mouth Daily. 90 tablet 1     No facility-administered medications prior to visit.       No opioid medication identified on active medication list. I have reviewed chart for other potential  high risk medication/s and harmful drug interactions in the elderly.          Aspirin is not on active medication list.  Aspirin use is not indicated based on review of current medical condition/s. Risk of harm outweighs potential benefits.  .    Patient Active Problem List   Diagnosis   • Need for immunization against influenza   • Weight gain   • Bone spur   • Screening for colon cancer   • Encounter for screening mammogram for malignant neoplasm of breast   • Menopause   • Encounter for screening and preventative care   • Vitamin D deficiency   • Cervical radiculopathy   • Normal pelvic exam   • Osteoporosis   • Thyroid nodule   • Benign positional vertigo   • Encounter for general adult medical examination without abnormal findings   • Elevated lipoprotein(a)     Advance Care Planning  Advance Directive is not on file.  ACP discussion was held with the patient during this visit. Patient does not have an advance directive, information provided.          Objective    A review of symptoms were performed and the pertinent positives are noted in the HPI.      Vitals:    07/07/22 1444   BP: 112/82   BP Location: Right  "arm   Patient Position: Sitting   Cuff Size: Adult   Pulse: 79   Resp: 16   Temp: 97.3 °F (36.3 °C)   TempSrc: Infrared   SpO2: 97%   Weight: 63.6 kg (140 lb 3.2 oz)   Height: 154.9 cm (61\")     Estimated body mass index is 26.49 kg/m² as calculated from the following:    Height as of this encounter: 154.9 cm (61\").    Weight as of this encounter: 63.6 kg (140 lb 3.2 oz).    BMI is >= 25 and <30. (Overweight) The following options were offered after discussion;: exercise counseling/recommendations      Does the patient have evidence of cognitive impairment? No    Physical Exam  Vitals and nursing note reviewed.   Constitutional:       Appearance: Normal appearance. She is well-developed.   HENT:      Head: Normocephalic and atraumatic.      Right Ear: Tympanic membrane normal.      Left Ear: Tympanic membrane normal.      Nose: No rhinorrhea.      Mouth/Throat:      Pharynx: No posterior oropharyngeal erythema.   Eyes:      Pupils: Pupils are equal, round, and reactive to light.   Cardiovascular:      Rate and Rhythm: Normal rate and regular rhythm.      Pulses: Normal pulses.      Heart sounds: Normal heart sounds. No murmur heard.  Pulmonary:      Effort: Pulmonary effort is normal.      Breath sounds: Normal breath sounds.   Chest:   Breasts:      Right: No inverted nipple or mass.      Left: No inverted nipple or mass.       Abdominal:      General: Bowel sounds are normal. There is no distension.      Palpations: Abdomen is soft.   Musculoskeletal:         General: No tenderness.      Cervical back: Normal range of motion and neck supple.   Skin:     Capillary Refill: Capillary refill takes less than 2 seconds.   Neurological:      Mental Status: She is alert and oriented to person, place, and time.   Psychiatric:         Mood and Affect: Mood normal.         Behavior: Behavior normal.         Lab Results   Component Value Date    TRIG 68 07/05/2022    HDL 63 (H) 07/05/2022     (H) 07/05/2022    VLDL 12 " 07/05/2022         ECG 12 Lead    Date/Time: 7/7/2022 3:12 PM  Performed by: Cherry Yadav MD  Authorized by: Cherry Yadav MD   Comparison: not compared with previous ECG   Previous ECG: no previous ECG available  Rhythm: sinus rhythm  ST Segments: ST segments normal    Clinical impression: normal ECG               HEALTH RISK ASSESSMENT    Smoking Status:  Social History     Tobacco Use   Smoking Status Never Smoker   Smokeless Tobacco Never Used     Alcohol Consumption:  Social History     Substance and Sexual Activity   Alcohol Use Never       Fall Risk Screen:    STEADI Fall Risk Assessment was completed, and patient is at LOW risk for falls.Assessment completed on:7/7/2022    Depression Screen:   PHQ-2/PHQ-9 Depression Screening 7/7/2022   Retired PHQ-9 Total Score -   Retired Total Score -   Little Interest or Pleasure in Doing Things 0-->not at all   Feeling Down, Depressed or Hopeless 0-->not at all   PHQ-9: Brief Depression Severity Measure Score 0       Health Habits and Functional and Cognitive Screening:  Functional & Cognitive Status 7/7/2022   Do you have difficulty preparing food and eating? No   Do you have difficulty bathing yourself, getting dressed or grooming yourself? No   Do you have difficulty using the toilet? No   Do you have difficulty moving around from place to place? No   Do you have trouble with steps or getting out of a bed or a chair? No   Current Diet Well Balanced Diet   Dental Exam Up to date   Eye Exam Up to date   Exercise (times per week) 2 times per week   Current Exercises Include Walking   Do you need help using the phone?  No   Are you deaf or do you have serious difficulty hearing?  Yes   Do you need help with transportation? No   Do you need help shopping? No   Do you need help preparing meals?  No   Do you need help with housework?  No   Do you need help with laundry? No   Do you need help taking your medications? No   Do you need help managing money? No    Do you ever drive or ride in a car without wearing a seat belt? No   Have you felt unusual stress, anger or loneliness in the last month? No   Who do you live with? Spouse   If you need help, do you have trouble finding someone available to you? No   Have you been bothered in the last four weeks by sexual problems? No   Do you have difficulty concentrating, remembering or making decisions? No       Visual Acuity:    No exam data present    Age-appropriate Screening Schedule:  Refer to the list below for future screening recommendations based on patient's age, sex and/or medical conditions. Orders for these recommended tests are listed in the plan section. The patient has been provided with a written plan.    Health Maintenance   Topic Date Due   • TDAP/TD VACCINES (1 - Tdap) Never done   • ZOSTER VACCINE (1 of 2) Never done   • PAP SMEAR  Never done   • INFLUENZA VACCINE  10/01/2022   • MAMMOGRAM  11/16/2022   • DXA SCAN  11/16/2022   • LIPID PANEL  07/05/2023          Assessment & Plan   CMS Preventative Services Quick Reference  Risk Factors Identified During Encounter  Cardiovascular Disease  Fall Risk-High or Moderate  The above risks/problems have been discussed with the patient.  Pertinent information has been shared with the patient in the After Visit Summary.  Follow up plans and orders are seen below in the Assessment/Plan Section.    Diagnoses and all orders for this visit:    1. Elevated lipoprotein(a) (Primary)  -     CT Cardiac Calcium Score Without Dye; Future    2. Encounter for general adult medical examination without abnormal findings    3. Vitamin D deficiency    4. Age-related osteoporosis without current pathological fracture    5. Welcome to Medicare preventive visit  -     ECG 12 Lead    6. Encounter for screening mammogram for malignant neoplasm of breast  -     Mammo Screening Digital Tomosynthesis Bilateral With CAD; Future    7. Dysuria  -     Urinalysis With Culture If Indicated -;  Future     Medicare annual wellness visit       -     The patient is doing well overall.       -     She will receive her shingles and tetanus vaccine..       -     CT Cardiac Calcium Score ordered.       -     Mammogram screening ordered.    Vitamin D deficiency       -     I instructed the patient to take vitamin D3 in the winter.      Hyperlipidemia       -     The patient will continue to work on good diet control.    Dysuria       -     Urinalysis ordered.        Return in 1 year.    Transcribed from ambient dictation for Cherry Yadav MD by MARIETTA BO.  07/07/22   18:38 EDT    Patient verbalized consent to the visit recording.  I have personally performed the services described in this document as transcribed by the above individual, and it is both accurate and complete.  Cherry Yadav MD  7/7/2022  20:54 EDT      Follow Up:   Return in about 1 year (around 7/7/2023), or if symptoms worsen or fail to improve, for Medicare Wellness.     An After Visit Summary and PPPS were made available to the patient.    During this visit for their annual exam, we reviewed their personal history, social history and family history.  We went over their medications and all the recommended health maintenence items for their age group. They were given the opportunity to ask questions and discuss other concerns.

## 2022-07-08 LAB
BACTERIA UR QL AUTO: ABNORMAL /HPF
BILIRUB UR QL STRIP: NEGATIVE
CLARITY UR: CLEAR
COLOR UR: YELLOW
GLUCOSE UR STRIP-MCNC: NEGATIVE MG/DL
HGB UR QL STRIP.AUTO: NEGATIVE
HYALINE CASTS UR QL AUTO: ABNORMAL /LPF
KETONES UR QL STRIP: NEGATIVE
LEUKOCYTE ESTERASE UR QL STRIP.AUTO: ABNORMAL
NITRITE UR QL STRIP: NEGATIVE
PH UR STRIP.AUTO: 6.5 [PH] (ref 5–8)
PROT UR QL STRIP: NEGATIVE
RBC # UR STRIP: ABNORMAL /HPF
REF LAB TEST METHOD: ABNORMAL
SP GR UR STRIP: 1.02 (ref 1–1.03)
SQUAMOUS #/AREA URNS HPF: ABNORMAL /HPF
UROBILINOGEN UR QL STRIP: ABNORMAL
WBC # UR STRIP: ABNORMAL /HPF

## 2022-07-10 LAB — BACTERIA SPEC AEROBE CULT: ABNORMAL

## 2022-07-12 RX ORDER — CEPHALEXIN 500 MG/1
500 CAPSULE ORAL 2 TIMES DAILY
Qty: 14 CAPSULE | Refills: 0 | Status: SHIPPED | OUTPATIENT
Start: 2022-07-12 | End: 2022-10-18

## 2022-07-13 ENCOUNTER — TELEPHONE (OUTPATIENT)
Dept: FAMILY MEDICINE CLINIC | Facility: CLINIC | Age: 67
End: 2022-07-13

## 2022-07-13 RX ORDER — FLUCONAZOLE 150 MG/1
150 TABLET ORAL ONCE
Qty: 1 TABLET | Refills: 0 | Status: SHIPPED | OUTPATIENT
Start: 2022-07-13 | End: 2022-07-13

## 2022-07-13 NOTE — PROGRESS NOTES
"[facility] FOR PATIENT WITH RESULTS. TOLD HER SHE COULD CALL THE OFFICE IF SHE HAD ANY FURTHER QUESTIONS    FOR HUB TO SAY    \"Please call the patient regarding her abnormal result. Urine showed infection and sent Rx for antiboitic to her pharmacy \""

## 2022-07-13 NOTE — TELEPHONE ENCOUNTER
Caller: Ave Hardin    Relationship: Self    Best call back number: [phone]    What medication are you requesting: SOMETHING FOR A YEAST INFECTION    What are your current symptoms:N/A-IS PICKING UP ANTIBIOTIC FOR A UTI AND SAYS SHE ALWAYS WILL GET A YEAST INFECTION AFTER. CAN SOMETHING BE CALLED IN PROACTIVELY?     Have you had these symptoms before:    [x] Yes  [] No    Have you been treated for these symptoms before:   [x] Yes  [] No    If a prescription is needed, what is your preferred pharmacy and phone number: [facility] [place], [place] [facility] [address] [place] [facility] [phone] [facility] [phone]      Additional notes:

## 2022-07-15 ENCOUNTER — TELEPHONE (OUTPATIENT)
Dept: FAMILY MEDICINE CLINIC | Facility: CLINIC | Age: 67
End: 2022-07-15

## 2022-07-15 RX ORDER — CHLORCYCLIZINE HYDROCHLORIDE AND PSEUDOEPHEDRINE HYDROCHLORIDE 25; 60 MG/1; MG/1
1 TABLET ORAL 2 TIMES DAILY
Qty: 40 TABLET | Refills: 1 | Status: SHIPPED | OUTPATIENT
Start: 2022-07-15 | End: 2022-10-18

## 2022-07-15 RX ORDER — BENZONATATE 100 MG/1
100 CAPSULE ORAL 3 TIMES DAILY PRN
Qty: 30 CAPSULE | Refills: 1 | Status: SHIPPED | OUTPATIENT
Start: 2022-07-15 | End: 2022-10-18

## 2022-07-15 NOTE — TELEPHONE ENCOUNTER
Caller: Ave Hardin    Relationship: Self    Best call back number: [phone]    What medication are you requesting: SOMETHING FOR COVID POSITIVE 7/15/22    What are your current symptoms: SORE THROAT, CONGESTED, FEVER.     How long have you been experiencing symptoms: OVERNIGHT.     Have you had these symptoms before:    [] Yes  [x] No    Have you been treated for these symptoms before:   [] Yes  [x] No    If a prescription is needed, what is your [facility] and [facility] [place] [place] [facility] [address] [facility] [phone] [facility] [phone]      Additional notes:

## 2022-10-10 ENCOUNTER — APPOINTMENT (OUTPATIENT)
Dept: CARDIOLOGY | Facility: HOSPITAL | Age: 67
End: 2022-10-10

## 2022-10-10 ENCOUNTER — APPOINTMENT (OUTPATIENT)
Dept: CT IMAGING | Facility: HOSPITAL | Age: 67
End: 2022-10-10

## 2022-10-13 ENCOUNTER — HOSPITAL ENCOUNTER (OUTPATIENT)
Dept: CT IMAGING | Facility: HOSPITAL | Age: 67
Discharge: HOME | End: 2022-10-13

## 2022-10-13 ENCOUNTER — HOSPITAL ENCOUNTER (OUTPATIENT)
Dept: CARDIOLOGY | Facility: HOSPITAL | Age: 67
Discharge: HOME | End: 2022-10-13

## 2022-10-13 DIAGNOSIS — Z13.9 SCREENING DUE: ICD-10-CM

## 2022-10-13 LAB
BH CV XLRA MEAS - MID AO DIAM: 1.4 CM
BH CV XLRA MEAS - PAD LEFT ABI PT: 1.07
BH CV XLRA MEAS - PAD LEFT ARM: 127 MMHG
BH CV XLRA MEAS - PAD LEFT LEG PT: 136 MMHG
BH CV XLRA MEAS - PAD RIGHT ABI PT: 1.04
BH CV XLRA MEAS - PAD RIGHT ARM: 127 MMHG
BH CV XLRA MEAS - PAD RIGHT LEG PT: 132 MMHG
BH CV XLRA MEAS LEFT DIST CCA EDV: -18.7 CM/SEC
BH CV XLRA MEAS LEFT DIST CCA PSV: -63.4 CM/SEC
BH CV XLRA MEAS LEFT ICA/CCA RATIO: 0.9
BH CV XLRA MEAS LEFT MID CCA PSV: 63 CM/SEC
BH CV XLRA MEAS LEFT MID ICA PSV: 55 CM/SEC
BH CV XLRA MEAS LEFT PROX ICA EDV: -22.6 CM/SEC
BH CV XLRA MEAS LEFT PROX ICA PSV: -55 CM/SEC
BH CV XLRA MEAS RIGHT DIST CCA EDV: -16.8 CM/SEC
BH CV XLRA MEAS RIGHT DIST CCA PSV: -59.6 CM/SEC
BH CV XLRA MEAS RIGHT ICA/CCA RATIO: 1.2
BH CV XLRA MEAS RIGHT MID CCA PSV: 60 CM/SEC
BH CV XLRA MEAS RIGHT MID ICA PSV: 72 CM/SEC
BH CV XLRA MEAS RIGHT PROX ICA EDV: -16.8 CM/SEC
BH CV XLRA MEAS RIGHT PROX ICA PSV: -72.1 CM/SEC
MAXIMAL PREDICTED HEART RATE: 155 BPM
STRESS TARGET HR: 132 BPM

## 2022-10-13 PROCEDURE — 75571 CT HRT W/O DYE W/CA TEST: CPT

## 2022-10-13 PROCEDURE — 93799 UNLISTED CV SVC/PROCEDURE: CPT

## 2022-10-18 ENCOUNTER — OFFICE VISIT (OUTPATIENT)
Dept: FAMILY MEDICINE CLINIC | Facility: CLINIC | Age: 67
End: 2022-10-18

## 2022-10-18 ENCOUNTER — LAB (OUTPATIENT)
Dept: FAMILY MEDICINE CLINIC | Facility: CLINIC | Age: 67
End: 2022-10-18

## 2022-10-18 VITALS
HEIGHT: 61 IN | HEART RATE: 82 BPM | OXYGEN SATURATION: 99 % | DIASTOLIC BLOOD PRESSURE: 60 MMHG | WEIGHT: 145.8 LBS | TEMPERATURE: 98 F | SYSTOLIC BLOOD PRESSURE: 110 MMHG | BODY MASS INDEX: 27.53 KG/M2 | RESPIRATION RATE: 18 BRPM

## 2022-10-18 DIAGNOSIS — K62.5 BRIGHT RED BLOOD PER RECTUM: Primary | ICD-10-CM

## 2022-10-18 LAB
ALBUMIN SERPL-MCNC: 4.3 G/DL (ref 3.5–5.2)
ALBUMIN/GLOB SERPL: 1.7 G/DL
ALP SERPL-CCNC: 59 U/L (ref 39–117)
ALT SERPL W P-5'-P-CCNC: 17 U/L (ref 1–33)
ANION GAP SERPL CALCULATED.3IONS-SCNC: 10.4 MMOL/L (ref 5–15)
APTT PPP: 27.2 SECONDS (ref 24–31)
AST SERPL-CCNC: 21 U/L (ref 1–32)
BASOPHILS # BLD AUTO: 0.03 10*3/MM3 (ref 0–0.2)
BASOPHILS NFR BLD AUTO: 0.3 % (ref 0–1.5)
BILIRUB SERPL-MCNC: 0.8 MG/DL (ref 0–1.2)
BUN SERPL-MCNC: 11 MG/DL (ref 8–23)
BUN/CREAT SERPL: 17.7 (ref 7–25)
CALCIUM SPEC-SCNC: 9.5 MG/DL (ref 8.6–10.5)
CHLORIDE SERPL-SCNC: 103 MMOL/L (ref 98–107)
CO2 SERPL-SCNC: 26.6 MMOL/L (ref 22–29)
CREAT SERPL-MCNC: 0.62 MG/DL (ref 0.57–1)
DEPRECATED RDW RBC AUTO: 41.7 FL (ref 37–54)
EGFRCR SERPLBLD CKD-EPI 2021: 98.4 ML/MIN/1.73
EOSINOPHIL # BLD AUTO: 0.04 10*3/MM3 (ref 0–0.4)
EOSINOPHIL NFR BLD AUTO: 0.4 % (ref 0.3–6.2)
ERYTHROCYTE [DISTWIDTH] IN BLOOD BY AUTOMATED COUNT: 12.9 % (ref 12.3–15.4)
GLOBULIN UR ELPH-MCNC: 2.6 GM/DL
GLUCOSE SERPL-MCNC: 113 MG/DL (ref 65–99)
HCT VFR BLD AUTO: 41.1 % (ref 34–46.6)
HGB BLD-MCNC: 13.3 G/DL (ref 12–15.9)
IMM GRANULOCYTES # BLD AUTO: 0.05 10*3/MM3 (ref 0–0.05)
IMM GRANULOCYTES NFR BLD AUTO: 0.5 % (ref 0–0.5)
INR PPP: 0.99 (ref 0.93–1.1)
LYMPHOCYTES # BLD AUTO: 2.34 10*3/MM3 (ref 0.7–3.1)
LYMPHOCYTES NFR BLD AUTO: 23.8 % (ref 19.6–45.3)
MCH RBC QN AUTO: 28.9 PG (ref 26.6–33)
MCHC RBC AUTO-ENTMCNC: 32.4 G/DL (ref 31.5–35.7)
MCV RBC AUTO: 89.3 FL (ref 79–97)
MONOCYTES # BLD AUTO: 0.48 10*3/MM3 (ref 0.1–0.9)
MONOCYTES NFR BLD AUTO: 4.9 % (ref 5–12)
NEUTROPHILS NFR BLD AUTO: 6.89 10*3/MM3 (ref 1.7–7)
NEUTROPHILS NFR BLD AUTO: 70.1 % (ref 42.7–76)
NRBC BLD AUTO-RTO: 0 /100 WBC (ref 0–0.2)
PLATELET # BLD AUTO: 249 10*3/MM3 (ref 140–450)
PMV BLD AUTO: 10.3 FL (ref 6–12)
POTASSIUM SERPL-SCNC: 3.7 MMOL/L (ref 3.5–5.2)
PROT SERPL-MCNC: 6.9 G/DL (ref 6–8.5)
PROTHROMBIN TIME: 10.2 SECONDS (ref 9.6–11.7)
RBC # BLD AUTO: 4.6 10*6/MM3 (ref 3.77–5.28)
SODIUM SERPL-SCNC: 140 MMOL/L (ref 136–145)
WBC NRBC COR # BLD: 9.83 10*3/MM3 (ref 3.4–10.8)

## 2022-10-18 PROCEDURE — 99214 OFFICE O/P EST MOD 30 MIN: CPT | Performed by: STUDENT IN AN ORGANIZED HEALTH CARE EDUCATION/TRAINING PROGRAM

## 2022-10-18 PROCEDURE — 36415 COLL VENOUS BLD VENIPUNCTURE: CPT | Performed by: STUDENT IN AN ORGANIZED HEALTH CARE EDUCATION/TRAINING PROGRAM

## 2022-10-18 PROCEDURE — 85025 COMPLETE CBC W/AUTO DIFF WBC: CPT | Performed by: STUDENT IN AN ORGANIZED HEALTH CARE EDUCATION/TRAINING PROGRAM

## 2022-10-18 PROCEDURE — 80053 COMPREHEN METABOLIC PANEL: CPT | Performed by: STUDENT IN AN ORGANIZED HEALTH CARE EDUCATION/TRAINING PROGRAM

## 2022-10-18 PROCEDURE — 85730 THROMBOPLASTIN TIME PARTIAL: CPT | Performed by: STUDENT IN AN ORGANIZED HEALTH CARE EDUCATION/TRAINING PROGRAM

## 2022-10-18 PROCEDURE — 85610 PROTHROMBIN TIME: CPT | Performed by: STUDENT IN AN ORGANIZED HEALTH CARE EDUCATION/TRAINING PROGRAM

## 2022-10-18 RX ORDER — PANTOPRAZOLE SODIUM 40 MG/1
40 TABLET, DELAYED RELEASE ORAL DAILY
Qty: 30 TABLET | Refills: 1 | Status: ON HOLD | OUTPATIENT
Start: 2022-10-18 | End: 2023-03-31

## 2022-10-18 NOTE — PROGRESS NOTES
"Chief Complaint  Chief Complaint   Patient presents with   • Diarrhea     With blood and pain  Sister diagnosed with Colon cancer in August  Pt states she had colonoscopy 09/2020   • Follow-up     Pt states she had vertigo the 15 th, 10-17 woke with extremely itching palms of hands and bottoms of both feet, progressed to entire body , ending with extreme chills shaking her entire bed, son gave her loratadine to help her sleep.  3 pm started throwing up, started bleeding from rectum, no stool involved.  Today- this morning felt better, did not throw up bowl of oat meal, dizzy, no bowl movement, but blood came out. 5 times in 24 hours. Similar to pain like mensis with pain in lower abdomen.        Subjective        Ave Hardin is a 67 y.o. female who presents to [facility].  History of Present Illness  Yesterday she woke up with itchy palms and soles of feet.  She then went to bathroom and had diarrhea.    Felt dizzy and tired.  Itching began to spread over whole body.  She had chills and rigors.  The itchiness and chills have resolved.    Went to sleep and slept for a few hours yesterday afternoon.  She woke up, ate some crackers, then vomited.  With the heaving she had blood leak out of her rectum.    She has passed bright red blood per rectum 4-5 times since.    No abd pain.    Did have a burger from [employer] the night before all of this started.  They brought it out to her rare, she sent it back to have medium well, and it was a long time before they brought it out to her again.  She took it home and had it around 2 am.  Then woke up around 6:30 with above issues.    She has not ever had blood in her stools that she remembers.      Objective   /60   Pulse 82   Temp 98 °F (36.7 °C)   Resp 18   Ht 154.9 cm (61\")   Wt 66.1 kg (145 lb 12.8 oz)   SpO2 99%   BMI 27.55 kg/m²     Estimated body mass index is 27.55 kg/m² as calculated from the following:    Height as " "of this encounter: 154.9 cm (61\").    Weight as of this encounter: 66.1 kg (145 lb 12.8 oz).     Physical Exam   GEN: In no acute distress, overall well appearing  RESP: No IWOB  ABD: Soft, nontender, nondistended, normal bowel sounds.  SKIN: No rashes     Result Review :    The following data was reviewed by: Pascual Coughlin DO on 10/18/2022:    Data reviewed: GI studies colonoscopy from 2020 with polyp, but no diverticulosis or hemorrhoids noted      Assessment and Plan     Diagnoses and all orders for this visit:    1. Bright red blood per rectum (Primary)  Overall she appears well today and reports improvement.  Most likely this is related to the burger from restaurant that was originally undercooked, then possibly sat for a while, then was recooked, then not eaten until hours later.  It is interesting that there is no stool with her bright red blood movements and no pain.  Other items on differential include diverticulosis, internal hemorrhoids, IBD.  Overall she appears stable and is improving so I don't think anything urgent needs to be done.  Will start protonix.  Check blood counts, platelets, and coag studies.  Refer to GI for possible earlier colonoscopy given recent diagnosis of sister with colon ca and new bleeding.  If she continues to bleed I recommended her go to ED for emergent evaluation.    -     pantoprazole (PROTONIX) 40 MG EC tablet; Take 1 tablet by mouth Daily.  Dispense: 30 tablet; Refill: 1  -     CBC Auto Differential  -     Comprehensive Metabolic Panel  -     Protime-INR  -     aPTT       Follow Up   If any worsening go to ED  "

## 2022-10-19 ENCOUNTER — TELEPHONE (OUTPATIENT)
Dept: FAMILY MEDICINE CLINIC | Facility: CLINIC | Age: 67
End: 2022-10-19

## 2022-10-19 NOTE — TELEPHONE ENCOUNTER
I called and left Ave a voicemail regarding her most recent lab work which was normal.  She had a normal CMP, normal CBC, normal PT/INR, normal PTT.  I was hoping to see how she was doing with her bright red blood per rectum so I will likely try again later today or on Friday.

## 2022-10-19 NOTE — TELEPHONE ENCOUNTER
Caller: Nya Hardin    Relationship: Self    Best call back number:[phone]    NYA WANTED TO THANK DR RAZA FOR CALLING TO CHECK ON HER. SHE DID SEE RESULTS AND FEELS MUCH BETTER TODAY. HAS A LITTLE FATIGUE BUT THAT IS ALL.  NO CALL BACK NECESSARY

## 2022-12-08 ENCOUNTER — OFFICE (AMBULATORY)
Dept: URBAN - METROPOLITAN AREA CLINIC 64 | Facility: CLINIC | Age: 67
End: 2022-12-08

## 2022-12-08 VITALS
DIASTOLIC BLOOD PRESSURE: 75 MMHG | WEIGHT: 147 LBS | HEIGHT: 61 IN | HEART RATE: 79 BPM | SYSTOLIC BLOOD PRESSURE: 110 MMHG

## 2022-12-08 DIAGNOSIS — Z12.31 ENCOUNTER FOR SCREENING MAMMOGRAM FOR MALIGNANT NEOPLASM OF BREAST: ICD-10-CM

## 2022-12-08 DIAGNOSIS — Z80.0 FAMILY HISTORY OF MALIGNANT NEOPLASM OF DIGESTIVE ORGANS: ICD-10-CM

## 2022-12-08 DIAGNOSIS — Z86.010 PERSONAL HISTORY OF COLONIC POLYPS: ICD-10-CM

## 2022-12-08 DIAGNOSIS — R19.7 DIARRHEA, UNSPECIFIED: ICD-10-CM

## 2022-12-08 PROCEDURE — 99213 OFFICE O/P EST LOW 20 MIN: CPT | Performed by: INTERNAL MEDICINE

## 2023-03-30 ENCOUNTER — APPOINTMENT (OUTPATIENT)
Dept: GENERAL RADIOLOGY | Facility: HOSPITAL | Age: 68
End: 2023-03-30
Payer: MEDICARE

## 2023-03-30 ENCOUNTER — HOSPITAL ENCOUNTER (OUTPATIENT)
Facility: HOSPITAL | Age: 68
Discharge: HOME | End: 2023-03-31
Attending: EMERGENCY MEDICINE | Admitting: EMERGENCY MEDICINE
Payer: MEDICARE

## 2023-03-30 DIAGNOSIS — I49.3 PREMATURE VENTRICULAR BEAT: ICD-10-CM

## 2023-03-30 DIAGNOSIS — R55 NEAR SYNCOPE: Primary | ICD-10-CM

## 2023-03-30 DIAGNOSIS — R00.2 PALPITATIONS: ICD-10-CM

## 2023-03-30 LAB
ALBUMIN SERPL-MCNC: 3.9 G/DL (ref 3.5–5.2)
ALBUMIN/GLOB SERPL: 1.3 G/DL
ALP SERPL-CCNC: 75 U/L (ref 39–117)
ALT SERPL W P-5'-P-CCNC: 14 U/L (ref 1–33)
ANION GAP SERPL CALCULATED.3IONS-SCNC: 9 MMOL/L (ref 5–15)
AST SERPL-CCNC: 17 U/L (ref 1–32)
BASOPHILS # BLD AUTO: 0.1 10*3/MM3 (ref 0–0.2)
BASOPHILS NFR BLD AUTO: 0.9 % (ref 0–1.5)
BILIRUB SERPL-MCNC: 0.3 MG/DL (ref 0–1.2)
BUN SERPL-MCNC: 16 MG/DL (ref 8–23)
BUN/CREAT SERPL: 25.8 (ref 7–25)
CALCIUM SPEC-SCNC: 9.5 MG/DL (ref 8.6–10.5)
CHLORIDE SERPL-SCNC: 106 MMOL/L (ref 98–107)
CO2 SERPL-SCNC: 26 MMOL/L (ref 22–29)
CREAT SERPL-MCNC: 0.62 MG/DL (ref 0.57–1)
DEPRECATED RDW RBC AUTO: 45.9 FL (ref 37–54)
EGFRCR SERPLBLD CKD-EPI 2021: 98.4 ML/MIN/1.73
EOSINOPHIL # BLD AUTO: 0.1 10*3/MM3 (ref 0–0.4)
EOSINOPHIL NFR BLD AUTO: 0.8 % (ref 0.3–6.2)
ERYTHROCYTE [DISTWIDTH] IN BLOOD BY AUTOMATED COUNT: 14.2 % (ref 12.3–15.4)
GEN 5 2HR TROPONIN T REFLEX: <6 NG/L
GLOBULIN UR ELPH-MCNC: 3 GM/DL
GLUCOSE SERPL-MCNC: 86 MG/DL (ref 65–99)
HCT VFR BLD AUTO: 41.7 % (ref 34–46.6)
HGB BLD-MCNC: 14 G/DL (ref 12–15.9)
LYMPHOCYTES # BLD AUTO: 2.3 10*3/MM3 (ref 0.7–3.1)
LYMPHOCYTES NFR BLD AUTO: 29.5 % (ref 19.6–45.3)
MCH RBC QN AUTO: 29.4 PG (ref 26.6–33)
MCHC RBC AUTO-ENTMCNC: 33.6 G/DL (ref 31.5–35.7)
MCV RBC AUTO: 87.5 FL (ref 79–97)
MONOCYTES # BLD AUTO: 0.5 10*3/MM3 (ref 0.1–0.9)
MONOCYTES NFR BLD AUTO: 5.9 % (ref 5–12)
NEUTROPHILS NFR BLD AUTO: 4.9 10*3/MM3 (ref 1.7–7)
NEUTROPHILS NFR BLD AUTO: 62.9 % (ref 42.7–76)
NRBC BLD AUTO-RTO: 0.1 /100 WBC (ref 0–0.2)
NT-PROBNP SERPL-MCNC: 48.4 PG/ML (ref 0–900)
PLATELET # BLD AUTO: 262 10*3/MM3 (ref 140–450)
PMV BLD AUTO: 7.9 FL (ref 6–12)
POTASSIUM SERPL-SCNC: 3.9 MMOL/L (ref 3.5–5.2)
PROT SERPL-MCNC: 6.9 G/DL (ref 6–8.5)
RBC # BLD AUTO: 4.77 10*6/MM3 (ref 3.77–5.28)
SODIUM SERPL-SCNC: 141 MMOL/L (ref 136–145)
TROPONIN T DELTA: NORMAL
TROPONIN T SERPL HS-MCNC: <6 NG/L
WBC NRBC COR # BLD: 7.7 10*3/MM3 (ref 3.4–10.8)

## 2023-03-30 PROCEDURE — 80053 COMPREHEN METABOLIC PANEL: CPT | Performed by: PHYSICIAN ASSISTANT

## 2023-03-30 PROCEDURE — 99284 EMERGENCY DEPT VISIT MOD MDM: CPT

## 2023-03-30 PROCEDURE — 36415 COLL VENOUS BLD VENIPUNCTURE: CPT

## 2023-03-30 PROCEDURE — 84484 ASSAY OF TROPONIN QUANT: CPT | Performed by: PHYSICIAN ASSISTANT

## 2023-03-30 PROCEDURE — 93005 ELECTROCARDIOGRAM TRACING: CPT

## 2023-03-30 PROCEDURE — 85025 COMPLETE CBC W/AUTO DIFF WBC: CPT | Performed by: PHYSICIAN ASSISTANT

## 2023-03-30 PROCEDURE — 83880 ASSAY OF NATRIURETIC PEPTIDE: CPT | Performed by: PHYSICIAN ASSISTANT

## 2023-03-30 PROCEDURE — 93005 ELECTROCARDIOGRAM TRACING: CPT | Performed by: EMERGENCY MEDICINE

## 2023-03-30 PROCEDURE — 71045 X-RAY EXAM CHEST 1 VIEW: CPT

## 2023-03-30 RX ORDER — SODIUM CHLORIDE 9 MG/ML
40 INJECTION, SOLUTION INTRAVENOUS AS NEEDED
Status: DISCONTINUED | OUTPATIENT
Start: 2023-03-30 | End: 2023-03-31 | Stop reason: HOSPADM

## 2023-03-30 RX ORDER — SODIUM CHLORIDE 0.9 % (FLUSH) 0.9 %
10 SYRINGE (ML) INJECTION AS NEEDED
Status: DISCONTINUED | OUTPATIENT
Start: 2023-03-30 | End: 2023-03-31 | Stop reason: HOSPADM

## 2023-03-30 RX ORDER — CHOLECALCIFEROL (VITAMIN D3) 125 MCG
5 CAPSULE ORAL NIGHTLY PRN
Status: DISCONTINUED | OUTPATIENT
Start: 2023-03-30 | End: 2023-03-31 | Stop reason: HOSPADM

## 2023-03-30 RX ORDER — SODIUM CHLORIDE 0.9 % (FLUSH) 0.9 %
10 SYRINGE (ML) INJECTION EVERY 12 HOURS SCHEDULED
Status: DISCONTINUED | OUTPATIENT
Start: 2023-03-30 | End: 2023-03-31 | Stop reason: HOSPADM

## 2023-03-30 RX ORDER — ACETAMINOPHEN 325 MG/1
650 TABLET ORAL EVERY 4 HOURS PRN
Status: DISCONTINUED | OUTPATIENT
Start: 2023-03-30 | End: 2023-03-31 | Stop reason: HOSPADM

## 2023-03-30 RX ORDER — ONDANSETRON 2 MG/ML
4 INJECTION INTRAMUSCULAR; INTRAVENOUS EVERY 6 HOURS PRN
Status: DISCONTINUED | OUTPATIENT
Start: 2023-03-30 | End: 2023-03-31 | Stop reason: HOSPADM

## 2023-03-30 RX ADMIN — Medication 10 ML: at 23:47

## 2023-03-30 NOTE — ED PROVIDER NOTES
Subjective    Provider in Triage Note  Patient is a 67-year-old female who presents with heart palpitations since around 4 PM today. Patient reports when she experiences these palpitations she gets lightheaded.  Patient reports she has history of mitral valve regurgitation and has had similar experiences before.  Patient denies chest pain at this time.   Patient denies lower extremity edema.  Denies shortness of breath.  Denies following with a cardiologist.  In no acute distress at this time.       History of Present Illness  Provider in triage note reviewed, agree with content    67-year-old female reports such severe palpitations that she thought she was going to pass out while driving.  The patient reports she does not have chest pain but did have some shortness of breath and a little bit of tightness.  She reports no recent fever chills or cough.  She reports no recent change in exercise tolerance or episodes occurring with exertion        Review of Systems   Constitutional: Positive for fatigue. Negative for chills and diaphoresis.   HENT: Negative for trouble swallowing.    Respiratory: Negative for cough.    Cardiovascular: Positive for palpitations.   Gastrointestinal: Negative for nausea.   Neurological: Positive for syncope. Negative for seizures and headaches.   All other systems reviewed and are negative.      Past Medical History:   Diagnosis Date   • Allergic    • Colon polyp    • Heart murmur    • Osteopenia        Allergies   Allergen Reactions   • Sulfa Antibiotics Hives       Past Surgical History:   Procedure Laterality Date   • COLONOSCOPY         Family History   Problem Relation Age of Onset   • Stroke Mother    • Dementia Mother        Social History     Socioeconomic History   • Marital status:    Tobacco Use   • Smoking status: Never     Passive exposure: Never   • Smokeless tobacco: Never   Vaping Use   • Vaping Use: Never used   Substance and Sexual Activity   • Alcohol use: Never    • Drug use: Never   • Sexual activity: Not Currently     No recent unusual food water travel or activity.  No previous episodes of panic while driving      Objective   Physical Exam  Alert Emmons Coma Scale 15   HEENT: Pupils equal and reactive to light. Conjunctivae are not injected. Normal tympanic membranes. Oropharynx and nares are normal.   Neck: Supple. Midline trachea. No JVD. No goiter.   Chest: Clear and equal breath sounds bilaterally, regular rate and rhythm without murmur or rub.  Occasional unifocal PVC   Abdomen: Positive bowel sounds, nontender, nondistended. No rebound or peritoneal signs. No CVA tenderness.   Extremities no clubbing. cyanosis or edema. Motor sensory exam is normal. The full range of motion is intact   Skin: Warm and dry, no rashes or petechia.   Lymphatic: No regional lymphadenopathy. No calf pain, swelling or Homans sign    Procedures           ED Course      Labs Reviewed   COMPREHENSIVE METABOLIC PANEL - Abnormal; Notable for the following components:       Result Value    BUN/Creatinine Ratio 25.8 (*)     All other components within normal limits    Narrative:     GFR Normal >60  Chronic Kidney Disease <60  Kidney Failure <15     TROPONIN - Normal    Narrative:     High Sensitive Troponin T Reference Range:  <10.0 ng/L- Negative Female for AMI  <15.0 ng/L- Negative Male for AMI  >=10 - Abnormal Female indicating possible myocardial injury.  >=15 - Abnormal Male indicating possible myocardial injury.   Clinicians would have to utilize clinical acumen, EKG, Troponin, and serial changes to determine if it is an Acute Myocardial Infarction or myocardial injury due to an underlying chronic condition.        BNP (IN-HOUSE) - Normal    Narrative:     Among patients with dyspnea, NT-proBNP is highly sensitive for the detection of acute congestive heart failure. In addition NT-proBNP of <300 pg/ml effectively rules out acute congestive heart failure with 99% negative predictive  value.    Results may be falsely decreased if patient taking Biotin.     CBC WITH AUTO DIFFERENTIAL - Normal   HIGH SENSITIVITIY TROPONIN T 2HR    Narrative:     High Sensitive Troponin T Reference Range:  <10.0 ng/L- Negative Female for AMI  <15.0 ng/L- Negative Male for AMI  >=10 - Abnormal Female indicating possible myocardial injury.  >=15 - Abnormal Male indicating possible myocardial injury.   Clinicians would have to utilize clinical acumen, EKG, Troponin, and serial changes to determine if it is an Acute Myocardial Infarction or myocardial injury due to an underlying chronic condition.        CBC AND DIFFERENTIAL    Narrative:     The following orders were created for panel order CBC & Differential.  Procedure                               Abnormality         Status                     ---------                               -----------         ------                     CBC Auto Differential[[id number]]        Normal              Final result                 Please view results for these tests on the individual orders.     Medications   sodium chloride 0.9 % flush 10 mL (has no administration in time range)     XR Chest 1 View    Result Date: 3/30/2023  Impression: No acute process. Electronically Signed: Bertin Cortes  3/30/2023 8:09 PM EDT  Workstation ID: [id number]                                         Medical Decision Making  Patient replaced observation overnight we will obtain serial troponin and EKG.  The patient will need a echocardiogram and stress Myoview.  She requested consultation from Dr. Robles.  Patient reports last stress test was probably 10 years ago    Amount and/or Complexity of Data Reviewed  Independent Historian: spouse  ECG/medicine tests: ordered.     Details: The patient had no acute ST segment elevation or depression, sinus rhythm      Risk  Decision regarding hospitalization.          Final diagnoses:   Near syncope   Palpitations   Premature ventricular beat       ED  Disposition  ED Disposition     ED Disposition   Decision to Admit    Condition   --    Comment   --             No follow-up provider specified.       Medication List      No changes were made to your prescriptions during this visit.          Ronaldo Haney MD  03/30/23 6373

## 2023-03-31 ENCOUNTER — READMISSION MANAGEMENT (OUTPATIENT)
Dept: CALL CENTER | Facility: HOSPITAL | Age: 68
End: 2023-03-31
Payer: MEDICARE

## 2023-03-31 ENCOUNTER — APPOINTMENT (OUTPATIENT)
Dept: NUCLEAR MEDICINE | Facility: HOSPITAL | Age: 68
End: 2023-03-31
Payer: MEDICARE

## 2023-03-31 ENCOUNTER — APPOINTMENT (OUTPATIENT)
Dept: RESPIRATORY THERAPY | Facility: HOSPITAL | Age: 68
End: 2023-03-31
Payer: MEDICARE

## 2023-03-31 ENCOUNTER — APPOINTMENT (OUTPATIENT)
Dept: CARDIOLOGY | Facility: HOSPITAL | Age: 68
End: 2023-03-31
Payer: MEDICARE

## 2023-03-31 VITALS
DIASTOLIC BLOOD PRESSURE: 83 MMHG | SYSTOLIC BLOOD PRESSURE: 125 MMHG | OXYGEN SATURATION: 97 % | RESPIRATION RATE: 18 BRPM | HEART RATE: 72 BPM | HEIGHT: 61 IN | TEMPERATURE: 97.9 F | BODY MASS INDEX: 28.89 KG/M2 | WEIGHT: 153 LBS

## 2023-03-31 LAB
ANION GAP SERPL CALCULATED.3IONS-SCNC: 10 MMOL/L (ref 5–15)
BASOPHILS # BLD AUTO: 0 10*3/MM3 (ref 0–0.2)
BASOPHILS NFR BLD AUTO: 0.6 % (ref 0–1.5)
BH CV ECHO MEAS - ACS: 1.8 CM
BH CV ECHO MEAS - AO MAX PG: 4.3 MMHG
BH CV ECHO MEAS - AO MEAN PG: 2 MMHG
BH CV ECHO MEAS - AO V2 MAX: 104 CM/SEC
BH CV ECHO MEAS - AO V2 VTI: 22.6 CM
BH CV ECHO MEAS - AVA(I,D): 2.01 CM2
BH CV ECHO MEAS - EDV(CUBED): 29.8 ML
BH CV ECHO MEAS - EDV(MOD-SP4): 50.2 ML
BH CV ECHO MEAS - EF(MOD-BP): 62 %
BH CV ECHO MEAS - EF(MOD-SP4): 61.8 %
BH CV ECHO MEAS - ESV(CUBED): 12.2 ML
BH CV ECHO MEAS - ESV(MOD-SP4): 19.2 ML
BH CV ECHO MEAS - FS: 25.8 %
BH CV ECHO MEAS - IVS/LVPW: 0.88 CM
BH CV ECHO MEAS - IVSD: 0.7 CM
BH CV ECHO MEAS - LA DIMENSION: 2.3 CM
BH CV ECHO MEAS - LAT PEAK E' VEL: 11.3 CM/SEC
BH CV ECHO MEAS - LV DIASTOLIC VOL/BSA (35-75): 29.8 CM2
BH CV ECHO MEAS - LV MASS(C)D: 56.8 GRAMS
BH CV ECHO MEAS - LV MAX PG: 4.2 MMHG
BH CV ECHO MEAS - LV MEAN PG: 2 MMHG
BH CV ECHO MEAS - LV SYSTOLIC VOL/BSA (12-30): 11.4 CM2
BH CV ECHO MEAS - LV V1 MAX: 102 CM/SEC
BH CV ECHO MEAS - LV V1 VTI: 20 CM
BH CV ECHO MEAS - LVIDD: 3.1 CM
BH CV ECHO MEAS - LVIDS: 2.3 CM
BH CV ECHO MEAS - LVOT AREA: 2.27 CM2
BH CV ECHO MEAS - LVOT DIAM: 1.7 CM
BH CV ECHO MEAS - LVPWD: 0.8 CM
BH CV ECHO MEAS - MED PEAK E' VEL: 7.5 CM/SEC
BH CV ECHO MEAS - MV A MAX VEL: 99 CM/SEC
BH CV ECHO MEAS - MV DEC SLOPE: 392 CM/SEC2
BH CV ECHO MEAS - MV DEC TIME: 0.17 MSEC
BH CV ECHO MEAS - MV E MAX VEL: 69.2 CM/SEC
BH CV ECHO MEAS - MV E/A: 0.7
BH CV ECHO MEAS - MV MAX PG: 3.7 MMHG
BH CV ECHO MEAS - MV MEAN PG: 2 MMHG
BH CV ECHO MEAS - MV P1/2T: 71.7 MSEC
BH CV ECHO MEAS - MV V2 VTI: 20.9 CM
BH CV ECHO MEAS - MVA(P1/2T): 3.1 CM2
BH CV ECHO MEAS - MVA(VTI): 2.17 CM2
BH CV ECHO MEAS - PA V2 MAX: 79.4 CM/SEC
BH CV ECHO MEAS - RV MAX PG: 1.47 MMHG
BH CV ECHO MEAS - RV V1 MAX: 60.7 CM/SEC
BH CV ECHO MEAS - RV V1 VTI: 12.4 CM
BH CV ECHO MEAS - RVDD: 2.2 CM
BH CV ECHO MEAS - SI(MOD-SP4): 18.4 ML/M2
BH CV ECHO MEAS - SV(LVOT): 45.4 ML
BH CV ECHO MEAS - SV(MOD-SP4): 31 ML
BH CV ECHO MEAS - TAPSE (>1.6): 2.29 CM
BH CV ECHO MEASUREMENTS AVERAGE E/E' RATIO: 7.36
BH CV NUCLEAR PRIOR STUDY: 2
BH CV REST NUCLEAR ISOTOPE DOSE: 11 MCI
BH CV STRESS BP STAGE 1: NORMAL
BH CV STRESS BP STAGE 2: NORMAL
BH CV STRESS DURATION MIN STAGE 1: 3
BH CV STRESS DURATION MIN STAGE 2: 3
BH CV STRESS DURATION SEC STAGE 1: 0
BH CV STRESS DURATION SEC STAGE 2: 1
BH CV STRESS GRADE STAGE 1: 10
BH CV STRESS GRADE STAGE 2: 12
BH CV STRESS HR STAGE 1: 120
BH CV STRESS HR STAGE 2: 152
BH CV STRESS METS STAGE 1: 5
BH CV STRESS METS STAGE 2: 7.5
BH CV STRESS NUCLEAR ISOTOPE DOSE: 33 MCI
BH CV STRESS PROTOCOL 1: NORMAL
BH CV STRESS RECOVERY BP: NORMAL MMHG
BH CV STRESS RECOVERY HR: 96 BPM
BH CV STRESS SPEED STAGE 1: 1.7
BH CV STRESS SPEED STAGE 2: 2.5
BH CV STRESS STAGE 1: 1
BH CV STRESS STAGE 2: 2
BH CV XLRA - TDI S': 11.4 CM/SEC
BUN SERPL-MCNC: 15 MG/DL (ref 8–23)
BUN/CREAT SERPL: 25 (ref 7–25)
CALCIUM SPEC-SCNC: 9.6 MG/DL (ref 8.6–10.5)
CHLORIDE SERPL-SCNC: 104 MMOL/L (ref 98–107)
CHOLEST SERPL-MCNC: 247 MG/DL (ref 0–200)
CO2 SERPL-SCNC: 26 MMOL/L (ref 22–29)
CREAT SERPL-MCNC: 0.6 MG/DL (ref 0.57–1)
DEPRECATED RDW RBC AUTO: 45.9 FL (ref 37–54)
EGFRCR SERPLBLD CKD-EPI 2021: 99.1 ML/MIN/1.73
EOSINOPHIL # BLD AUTO: 0.1 10*3/MM3 (ref 0–0.4)
EOSINOPHIL NFR BLD AUTO: 1.2 % (ref 0.3–6.2)
ERYTHROCYTE [DISTWIDTH] IN BLOOD BY AUTOMATED COUNT: 14.2 % (ref 12.3–15.4)
GLUCOSE SERPL-MCNC: 101 MG/DL (ref 65–99)
HCT VFR BLD AUTO: 40.9 % (ref 34–46.6)
HDLC SERPL-MCNC: 64 MG/DL (ref 40–60)
HGB BLD-MCNC: 13.6 G/DL (ref 12–15.9)
LDLC SERPL CALC-MCNC: 174 MG/DL (ref 0–100)
LDLC/HDLC SERPL: 2.68 {RATIO}
LEFT ATRIUM VOLUME INDEX: 16.1 ML/M2
LV EF NUC BP: 83 %
LYMPHOCYTES # BLD AUTO: 3.5 10*3/MM3 (ref 0.7–3.1)
LYMPHOCYTES NFR BLD AUTO: 44.2 % (ref 19.6–45.3)
MAXIMAL PREDICTED HEART RATE: 154 BPM
MAXIMAL PREDICTED HEART RATE: 154 BPM
MCH RBC QN AUTO: 29.2 PG (ref 26.6–33)
MCHC RBC AUTO-ENTMCNC: 33.3 G/DL (ref 31.5–35.7)
MCV RBC AUTO: 87.6 FL (ref 79–97)
MONOCYTES # BLD AUTO: 0.5 10*3/MM3 (ref 0.1–0.9)
MONOCYTES NFR BLD AUTO: 6.4 % (ref 5–12)
NEUTROPHILS NFR BLD AUTO: 3.8 10*3/MM3 (ref 1.7–7)
NEUTROPHILS NFR BLD AUTO: 47.6 % (ref 42.7–76)
NRBC BLD AUTO-RTO: 0 /100 WBC (ref 0–0.2)
PERCENT MAX PREDICTED HR: 98.7 %
PLATELET # BLD AUTO: 248 10*3/MM3 (ref 140–450)
PMV BLD AUTO: 8.5 FL (ref 6–12)
POTASSIUM SERPL-SCNC: 4 MMOL/L (ref 3.5–5.2)
RBC # BLD AUTO: 4.67 10*6/MM3 (ref 3.77–5.28)
SINUS: 2.7 CM
SODIUM SERPL-SCNC: 140 MMOL/L (ref 136–145)
STRESS BASELINE BP: NORMAL MMHG
STRESS BASELINE HR: 85 BPM
STRESS PERCENT HR: 116 %
STRESS POST ESTIMATED WORKLOAD: 7 METS
STRESS POST EXERCISE DUR MIN: 6 MIN
STRESS POST EXERCISE DUR SEC: 1 SEC
STRESS POST PEAK BP: NORMAL MMHG
STRESS POST PEAK HR: 152 BPM
STRESS TARGET HR: 131 BPM
STRESS TARGET HR: 131 BPM
TRIGL SERPL-MCNC: 58 MG/DL (ref 0–150)
TROPONIN T SERPL HS-MCNC: <6 NG/L
VLDLC SERPL-MCNC: 9 MG/DL (ref 5–40)
WBC NRBC COR # BLD: 7.9 10*3/MM3 (ref 3.4–10.8)
WHOLE BLOOD HOLD COAG: NORMAL

## 2023-03-31 PROCEDURE — 78452 HT MUSCLE IMAGE SPECT MULT: CPT | Performed by: INTERNAL MEDICINE

## 2023-03-31 PROCEDURE — G0378 HOSPITAL OBSERVATION PER HR: HCPCS

## 2023-03-31 PROCEDURE — 93306 TTE W/DOPPLER COMPLETE: CPT | Performed by: INTERNAL MEDICINE

## 2023-03-31 PROCEDURE — 93018 CV STRESS TEST I&R ONLY: CPT | Performed by: INTERNAL MEDICINE

## 2023-03-31 PROCEDURE — 84484 ASSAY OF TROPONIN QUANT: CPT | Performed by: EMERGENCY MEDICINE

## 2023-03-31 PROCEDURE — 80061 LIPID PANEL: CPT | Performed by: EMERGENCY MEDICINE

## 2023-03-31 PROCEDURE — 80048 BASIC METABOLIC PNL TOTAL CA: CPT | Performed by: EMERGENCY MEDICINE

## 2023-03-31 PROCEDURE — 85025 COMPLETE CBC W/AUTO DIFF WBC: CPT | Performed by: EMERGENCY MEDICINE

## 2023-03-31 PROCEDURE — 93306 TTE W/DOPPLER COMPLETE: CPT

## 2023-03-31 PROCEDURE — A9502 TC99M TETROFOSMIN: HCPCS | Performed by: EMERGENCY MEDICINE

## 2023-03-31 PROCEDURE — 93017 CV STRESS TEST TRACING ONLY: CPT

## 2023-03-31 PROCEDURE — 99232 SBSQ HOSP IP/OBS MODERATE 35: CPT | Performed by: INTERNAL MEDICINE

## 2023-03-31 PROCEDURE — 78452 HT MUSCLE IMAGE SPECT MULT: CPT

## 2023-03-31 PROCEDURE — 0 TECHNETIUM TETROFOSMIN KIT: Performed by: EMERGENCY MEDICINE

## 2023-03-31 RX ADMIN — TETROFOSMIN 1 DOSE: 1.38 INJECTION, POWDER, LYOPHILIZED, FOR SOLUTION INTRAVENOUS at 09:10

## 2023-03-31 RX ADMIN — Medication 10 ML: at 08:00

## 2023-03-31 RX ADMIN — TETROFOSMIN 1 DOSE: 1.38 INJECTION, POWDER, LYOPHILIZED, FOR SOLUTION INTRAVENOUS at 06:59

## 2023-03-31 NOTE — CASE MANAGEMENT/SOCIAL WORK
Discharge Planning Assessment   [place]     Patient Name: Ave Hardin  MRN: [id number]  Today's Date: 3/31/2023    Admit Date: 3/30/2023    Plan: Home with family.   Discharge Needs Assessment     Row Name 03/31/23 1408       Living Environment    People in Home spouse;child(earl), adult    Current Living Arrangements home    Primary Care Provided by self    Provides Primary Care For no one    Family Caregiver if Needed spouse    Quality of Family Relationships helpful;involved;supportive    Able to Return to Prior Arrangements yes       Resource/Environmental Concerns    Resource/Environmental Concerns none    Transportation Concerns none       Transition Planning    Patient/Family Anticipates Transition to home with family    Patient/Family Anticipated Services at Transition none    Transportation Anticipated family or friend will provide       Discharge Needs Assessment    Readmission Within the Last 30 Days no previous admission in last 30 days    Equipment Currently Used at Home none    Concerns to be Addressed no discharge needs identified;denies needs/concerns at this time    Anticipated Changes Related to Illness none    Equipment Needed After Discharge none               Discharge Plan     Row Name 03/31/23 1409       Plan    Plan Home with family.    Patient/Family in Agreement with Plan yes    Plan Comments Patient lives at home with spouse and adult children. Patient drives, patient's spouse will provide transportation at discharge. Patient performs ADL. PCP and pharmacy confirmed. Denies financial assistance needs for medication and/or food. Denies HH and/or rehab needs.    Final Discharge Disposition Code 01 - home or self-care    Final Note Home               Demographic Summary     Row Name 03/31/23 1403       General Information    Admission Type observation    Arrived From emergency department    Required Notices Provided Observation Status Notice    Referral Source admission list    Reason for  Consult discharge planning    Preferred Language English               Functional Status     Row Name 03/31/23 1408       Functional Status    Usual Activity Tolerance good    Current Activity Tolerance good       Functional Status, IADL    Medications independent    Meal Preparation independent    Housekeeping independent    Laundry independent    Shopping independent              Met with patient in room wearing PPE: mask..      Maintained distance greater than six feet and spent less than 15 minutes in the room.      Silvia Saha RN, BSN  Obs/3C/Float   [address] [address]  Phone: [phone]  Fax: [phone]

## 2023-03-31 NOTE — OUTREACH NOTE
Prep Survey    Flowsheet Row Responses   [facility] patient discharged from? [place]   Is LACE score < 7 ? Yes   Eligibility [facility]   Date of Admission 03/30/23   Date of Discharge 03/31/23   Discharge Disposition Home or Self Care   Discharge diagnosis Near syncope   Does the patient have one of the following disease processes/diagnoses(primary or secondary)? Other   Does the patient have Home health ordered? No   Is there a DME ordered? No   Prep survey completed? Yes          Gina CHAMPAGNE - Registered Nurse

## 2023-03-31 NOTE — CONSULTS
Referring Provider: Ronaldo Haney MD    Reason for Consultation: Palpitations, near -syncope, lightheadedness, mitral valve prolapse      Patient Care Team:  Cherry Yadav MD as PCP - General (Internal Medicine)      SUBJECTIVE     Chief Complaint: Lightheadedness and near syncope.    History of present illness:  Ave Hardin is a 67 y.o. female with self-reported history of mitral valve prolapse who presents to the hospital with complaints of palpitations which started 1 day prior to presenting to the hospital.  She experienced these palpitations and began to feel lightheaded.  She has a history of mitral regurgitation and mitral valve prolapse and has had similar experiences and episodes before.  She denies any chest pain or shortness of breath or leg edema.  She does not follow-up with any cardiologist.  She felt that she was going to lose consciousness while driving.      Review of systems:    Constitutional: No weakness, fatigue, fever, rigors, chills   Eyes: No vision changes, eye pain   ENT/oropharynx: No difficulty swallowing, sore throat, epistaxis, changes in hearing   Cardiovascular: No chest pain, chest tightness, +palpitations, paroxysmal nocturnal dyspnea, orthopnea, diaphoresis, dizziness / syncopal episode   Respiratory: No shortness of breath, dyspnea on exertion, cough, wheezing, hemoptysis   Gastrointestinal: No abdominal pain, nausea, vomiting, diarrhea, bloody stools   Genitourinary: No hematuria, dysuria   Neurological: No headache, tremors, numbness, one-sided weakness    Musculoskeletal: No cramps, myalgias, joint pain, joint swelling   Integument: No rash, edema        Personal History:      Past Medical History:   Diagnosis Date   • Allergic    • Colon polyp    • Heart murmur    • Osteopenia        Past Surgical History:   Procedure Laterality Date   • COLONOSCOPY         Family History   Problem Relation Age of Onset   • Stroke Mother    • Dementia Mother        Social  "History     Tobacco Use   • Smoking status: Never     Passive exposure: Never   • Smokeless tobacco: Never   Vaping Use   • Vaping Use: Never used   Substance Use Topics   • Alcohol use: Never   • Drug use: Never        No medications prior to admission.        Allergies:     Sulfa antibiotics    Scheduled Meds:sodium chloride, 10 mL, Intravenous, Q12H      Continuous Infusions:   PRN Meds:•  acetaminophen  •  melatonin  •  ondansetron  •  sodium chloride  •  sodium chloride  •  sodium chloride      OBJECTIVE    Vital Signs  Vitals:    03/31/23 0800 03/31/23 0815 03/31/23 0932 03/31/23 1037   BP:   122/62 125/83   BP Location:    Right arm   Patient Position:    Lying   Pulse: 71 69  72   Resp:    18   Temp:    97.9 °F (36.6 °C)   TempSrc:    Oral   SpO2:    97%   Weight:   69.4 kg (153 lb)    Height:   154.9 cm (61\")        Flowsheet Rows    Flowsheet Row First Filed Value   Admission Height 154.9 cm (61\") Documented at 03/30/2023 1919   Admission Weight 69.6 kg (153 lb 7 oz) Documented at 03/30/2023 1919            Intake/Output Summary (Last 24 hours) at 3/31/2023 1045  Last data filed at 3/31/2023 1025  Gross per 24 hour   Intake 0 ml   Output --   Net 0 ml        Telemetry: Sinus rhythm, no heart block, no arrhythmia.    Physical Exam:  The patient is alert, oriented and in no distress.  Vital signs as noted above.  Head and neck revealed no carotid bruits or jugular venous distention.  No thyromegaly or lymph adenopathy is present  Lungs clear.  No wheezing.  Breath sounds are normal bilaterally.  Heart normal first and second heart sounds. No murmur.  No precordial rub is present.  No gallop is present.  Abdomen soft and nontender.  No organomegaly is present.  Extremities with good peripheral pulses without any pedal edema.  Skin warm and dry.  Musculoskeletal system is grossly normal.  CNS grossly normal.       Results Review:  I have personally reviewed the results from the time of this admission to " 3/31/2023 10:45 EDT and agree with these findings:  []  Laboratory  []  Microbiology  []  Radiology  []  EKG/Telemetry   []  Cardiology/Vascular   []  Pathology  []  Old records  []  Other:    Most notable findings include:     Lab Results (last 24 hours)     Procedure Component Value Units Date/Time    Lipid Panel [[id number]]  (Abnormal) Collected: 03/31/23 0512    Specimen: Blood from Arm, Right Updated: 03/31/23 0649     Total Cholesterol 247 mg/dL      Triglycerides 58 mg/dL      HDL Cholesterol 64 mg/dL      LDL Cholesterol  174 mg/dL      VLDL Cholesterol 9 mg/dL      LDL/HDL Ratio 2.68    Narrative:      Cholesterol Reference Ranges  (U.S. Department of Health and Human Services ATP III Classifications)    Desirable          <200 mg/dL  Borderline High    200-239 mg/dL  High Risk          >240 mg/dL      Triglyceride Reference Ranges  (U.S. Department of Health and Human Services ATP III Classifications)    Normal           <150 mg/dL  Borderline High  150-199 mg/dL  High             200-499 mg/dL  Very High        >500 mg/dL    HDL Reference Ranges  (U.S. Department of Health and Human Services ATP III Classifications)    Low     <40 mg/dl (major risk factor for CHD)  High    >60 mg/dl ('negative' risk factor for CHD)        LDL Reference Ranges  (U.S. Department of Health and Human Services ATP III Classifications)    Optimal          <100 mg/dL  Near Optimal     100-129 mg/dL  Borderline High  130-159 mg/dL  High             160-189 mg/dL  Very High        >189 mg/dL    High Sensitivity Troponin T [[id number]]  (Normal) Collected: 03/31/23 0512    Specimen: Blood from Arm, Right Updated: 03/31/23 0632     HS Troponin T <6 ng/L     Narrative:      High Sensitive Troponin T Reference Range:  <10.0 ng/L- Negative Female for AMI  <15.0 ng/L- Negative Male for AMI  >=10 - Abnormal Female indicating possible myocardial injury.  >=15 - Abnormal Male indicating possible myocardial injury.   Clinicians would have to  utilize clinical acumen, EKG, Troponin, and serial changes to determine if it is an Acute Myocardial Infarction or myocardial injury due to an underlying chronic condition.         Basic Metabolic Panel [[id number]]  (Abnormal) Collected: 03/31/23 0512    Specimen: Blood from Arm, Right Updated: 03/31/23 0629     Glucose 101 mg/dL      BUN 15 mg/dL      Creatinine 0.60 mg/dL      Sodium 140 mmol/L      Potassium 4.0 mmol/L      Chloride 104 mmol/L      CO2 26.0 mmol/L      Calcium 9.6 mg/dL      BUN/Creatinine Ratio 25.0     Anion Gap 10.0 mmol/L      eGFR 99.1 mL/min/1.73     Narrative:      GFR Normal >60  Chronic Kidney Disease <60  Kidney Failure <15      Extra Tubes [[id number]] Collected: 03/31/23 0512    Specimen: Blood from Arm, Right Updated: 03/31/23 0615    Narrative:      The following orders were created for panel order Extra Tubes.  Procedure                               Abnormality         Status                     ---------                               -----------         ------                     Light Blue Top[[id number]]                                   Final result                 Please view results for these tests on the individual orders.    Light Blue Top [[id number]] Collected: 03/31/23 0512    Specimen: Blood from Arm, Right Updated: 03/31/23 0615     Extra Tube Hold for add-ons.     Comment: Auto resulted       CBC Auto Differential [[id number]]  (Abnormal) Collected: 03/31/23 0512    Specimen: Blood from Arm, Right Updated: 03/31/23 0611     WBC 7.90 10*3/mm3      RBC 4.67 10*6/mm3      Hemoglobin 13.6 g/dL      Hematocrit 40.9 %      MCV 87.6 fL      MCH 29.2 pg      MCHC 33.3 g/dL      RDW 14.2 %      RDW-SD 45.9 fl      MPV 8.5 fL      Platelets 248 10*3/mm3      Neutrophil % 47.6 %      Lymphocyte % 44.2 %      Monocyte % 6.4 %      Eosinophil % 1.2 %      Basophil % 0.6 %      Neutrophils, Absolute 3.80 10*3/mm3      Lymphocytes, Absolute 3.50 10*3/mm3      Monocytes, Absolute 0.50  10*3/mm3      Eosinophils, Absolute 0.10 10*3/mm3      Basophils, Absolute 0.00 10*3/mm3      nRBC 0.0 /100 WBC     High Sensitivity Troponin T 2Hr [[id number]] Collected: 03/30/23 2257    Specimen: Blood Updated: 03/30/23 2322     HS Troponin T <6 ng/L      Troponin T Delta --     Comment: Unable to calculate.       Narrative:      High Sensitive Troponin T Reference Range:  <10.0 ng/L- Negative Female for AMI  <15.0 ng/L- Negative Male for AMI  >=10 - Abnormal Female indicating possible myocardial injury.  >=15 - Abnormal Male indicating possible myocardial injury.   Clinicians would have to utilize clinical acumen, EKG, Troponin, and serial changes to determine if it is an Acute Myocardial Infarction or myocardial injury due to an underlying chronic condition.         High Sensitivity Troponin T [[id number]]  (Normal) Collected: 03/30/23 2105    Specimen: Blood Updated: 03/30/23 2132     HS Troponin T <6 ng/L     Narrative:      High Sensitive Troponin T Reference Range:  <10.0 ng/L- Negative Female for AMI  <15.0 ng/L- Negative Male for AMI  >=10 - Abnormal Female indicating possible myocardial injury.  >=15 - Abnormal Male indicating possible myocardial injury.   Clinicians would have to utilize clinical acumen, EKG, Troponin, and serial changes to determine if it is an Acute Myocardial Infarction or myocardial injury due to an underlying chronic condition.         Comprehensive Metabolic Panel [[id number]]  (Abnormal) Collected: 03/30/23 2105    Specimen: Blood Updated: 03/30/23 2129     Glucose 86 mg/dL      BUN 16 mg/dL      Creatinine 0.62 mg/dL      Sodium 141 mmol/L      Potassium 3.9 mmol/L      Chloride 106 mmol/L      CO2 26.0 mmol/L      Calcium 9.5 mg/dL      Total Protein 6.9 g/dL      Albumin 3.9 g/dL      ALT (SGPT) 14 U/L      AST (SGOT) 17 U/L      Alkaline Phosphatase 75 U/L      Total Bilirubin 0.3 mg/dL      Globulin 3.0 gm/dL      A/G Ratio 1.3 g/dL      BUN/Creatinine Ratio 25.8     Anion Gap  9.0 mmol/L      eGFR 98.4 mL/min/1.73     Narrative:      GFR Normal >60  Chronic Kidney Disease <60  Kidney Failure <15      BNP [[id number]]  (Normal) Collected: 03/30/23 2021    Specimen: Blood Updated: 03/30/23 2056     proBNP 48.4 pg/mL     Narrative:      Among patients with dyspnea, NT-proBNP is highly sensitive for the detection of acute congestive heart failure. In addition NT-proBNP of <300 pg/ml effectively rules out acute congestive heart failure with 99% negative predictive value.    Results may be falsely decreased if patient taking Biotin.      CBC & Differential [[id number]]  (Normal) Collected: 03/30/23 2021    Specimen: Blood Updated: 03/30/23 2031    Narrative:      The following orders were created for panel order CBC & Differential.  Procedure                               Abnormality         Status                     ---------                               -----------         ------                     CBC Auto Differential[[id number]]        Normal              Final result                 Please view results for these tests on the individual orders.    CBC Auto Differential [[id number]]  (Normal) Collected: 03/30/23 2021    Specimen: Blood Updated: 03/30/23 2031     WBC 7.70 10*3/mm3      RBC 4.77 10*6/mm3      Hemoglobin 14.0 g/dL      Hematocrit 41.7 %      MCV 87.5 fL      MCH 29.4 pg      MCHC 33.6 g/dL      RDW 14.2 %      RDW-SD 45.9 fl      MPV 7.9 fL      Platelets 262 10*3/mm3      Neutrophil % 62.9 %      Lymphocyte % 29.5 %      Monocyte % 5.9 %      Eosinophil % 0.8 %      Basophil % 0.9 %      Neutrophils, Absolute 4.90 10*3/mm3      Lymphocytes, Absolute 2.30 10*3/mm3      Monocytes, Absolute 0.50 10*3/mm3      Eosinophils, Absolute 0.10 10*3/mm3      Basophils, Absolute 0.10 10*3/mm3      nRBC 0.1 /100 WBC           Imaging Results (Last 24 Hours)     Procedure Component Value Units Date/Time    XR Chest 1 View [[id number]] Collected: 03/30/23 2008     Updated: 03/30/23 2011     Narrative:      XR CHEST 1 VW    Date of Exam: 3/30/2023 7:48 PM EDT    Indication: Chest Pain Protocol  Chest Pain Protocol.    Comparison: None available.    Findings:  The cardiomediastinal silhouette is within normal limits. Lungs are clear. No focal consolidation, pneumothorax, or significant pleural effusion. Osseous structures grossly intact.      Impression:      Impression:  No acute process.    Electronically Signed: [employer]    3/30/2023 8:09 PM EDT    Workstation ID: [id number]          LAB RESULTS (LAST 7 DAYS)    CBC  Results from last 7 days   Lab Units 03/31/23  0512 03/30/23 2021   WBC 10*3/mm3 7.90 7.70   RBC 10*6/mm3 4.67 4.77   HEMOGLOBIN g/dL 13.6 14.0   HEMATOCRIT % 40.9 41.7   MCV fL 87.6 87.5   PLATELETS 10*3/mm3 248 262       BMP  Results from last 7 days   Lab Units 03/31/23  0512 03/30/23  2105   SODIUM mmol/L 140 141   POTASSIUM mmol/L 4.0 3.9   CHLORIDE mmol/L 104 106   CO2 mmol/L 26.0 26.0   BUN mg/dL 15 16   CREATININE mg/dL 0.60 0.62   GLUCOSE mg/dL 101* 86       CMP   Results from last 7 days   Lab Units 03/31/23  0512 03/30/23  2105   SODIUM mmol/L 140 141   POTASSIUM mmol/L 4.0 3.9   CHLORIDE mmol/L 104 106   CO2 mmol/L 26.0 26.0   BUN mg/dL 15 16   CREATININE mg/dL 0.60 0.62   GLUCOSE mg/dL 101* 86   ALBUMIN g/dL  --  3.9   BILIRUBIN mg/dL  --  0.3   ALK PHOS U/L  --  75   AST (SGOT) U/L  --  17   ALT (SGPT) U/L  --  14       BNP        TROPONIN  Results from last 7 days   Lab Units 03/31/23 0512   HSTROP T ng/L <6       CoAg        Creatinine Clearance  Estimated Creatinine Clearance: 82.1 mL/min (by C-G formula based on SCr of 0.6 mg/dL).    ABG          Radiology  XR Chest 1 View    Result Date: 3/30/2023  Impression: No acute process. Electronically Signed: [employer]  3/30/2023 8:09 PM EDT  Workstation ID: [id number]        EKG  I personally viewed and interpreted the patient's EKG/Telemetry data:  ECG 12 Lead Other; palpitations   Preliminary Result   HEART RATE= 85  bpm    RR Interval= 648  ms   MT Interval= 145  ms   P Horizontal Axis= 19  deg   P Front Axis= 50  deg   QRSD Interval= 77  ms   QT Interval= 349  ms   QRS Axis= -15  deg   T Wave Axis= 72  deg   - ABNORMAL ECG -   Sinus rhythm   Sinus pause   Low voltage, precordial leads   Electronically Signed By:    Date and Time of Study: 2023-03-30 19:23:42      SCANNED - TELEMETRY     Final Result            Echocardiogram:          Stress Test:  Results for orders placed during the hospital encounter of 03/30/23    Stress Test With Myocardial Perfusion One Day    Interpretation Summary  •  Findings consistent with a normal ECG stress test.  •  Moderate risk for ischemic heart disease.  •  Myocardial perfusion imaging indicates a normal myocardial perfusion study with no evidence of ischemia.  •  Impressions are consistent with a low risk study.        Cardiac Catheterization:  No results found for this or any previous visit.        Other:      ASSESSMENT & PLAN:    Principal Problem:    Near syncope  Hyperlipidemia  Palpitation  Mitral valve prolapse    67-year-old woman with no significant medical history other than hyperlipidemia presents with palpitations and near syncope.  Her labs are essentially unremarkable including negative troponin, negative proBNP.  ECG shows normal sinus rhythm and there have been no events on telemetry overnight.  Echocardiogram shows EF of 62%, grade 1 diastolic dysfunction and no significant valvular abnormalities.  A nuclear stress test is negative for ischemia.   , HDL 64, triglyceride 58 and total cholesterol 247.  She does have hyperlipidemia and I have recommended her to be started on statin however she is reluctant to do so.  Fortunately her coronary artery calcium score is 0 from 2022.  Carotid arteries are also normal.  Diet modification and weight loss discussed with the patient.  Repeat lipid panel in 3 months.  I will prescribe her MCOT for 30 days.  Plan is to follow-up with me  in the office after 4- 6 weeks.  I have provided my recommendations to the patient and primary team.    Joaquín Zepeda MD  03/31/23  10:45 EDT

## 2023-03-31 NOTE — DISCHARGE SUMMARY
[facility]    Cherry Yadav MD    CHIEF COMPLAINT:     Near Syncope     HISTORY OF PRESENT ILLNESS:    HPI    Patient is a 67-year-old female who presents with heart palpitations since around 4 PM today. Patient reports when she experiences these palpitations she gets lightheaded.  Patient reports she has history of mitral valve regurgitation and has had similar experiences before.  Patient denies chest pain at this time.   Patient denies lower extremity edema.  Denies shortness of breath.  Denies following with a cardiologist.  In no acute distress at this time.         History of Present Illness  Provider in triage note reviewed, agree with content     67-year-old female reports such severe palpitations that she thought she was going to pass out while driving.  The patient reports she does not have chest pain but did have some shortness of breath and a little bit of tightness.  She reports no recent fever chills or cough.  She reports no recent change in exercise tolerance or episodes occurring with exertion.    Past Medical History:   Diagnosis Date   • Allergic    • Colon polyp    • Heart murmur    • Osteopenia      Past Surgical History:   Procedure Laterality Date   • COLONOSCOPY       Family History   Problem Relation Age of Onset   • Stroke Mother    • Dementia Mother      Social History     Tobacco Use   • Smoking status: Never     Passive exposure: Never   • Smokeless tobacco: Never   Vaping Use   • Vaping Use: Never used   Substance Use Topics   • Alcohol use: Never   • Drug use: Never     No medications prior to admission.     Allergies:  Sulfa antibiotics    Immunization History   Administered Date(s) Administered   • COVID-19 (CHOCO) 05/15/2021   • COVID-19 (MODERNA) BOOSTER 12/29/2021   • FluLaval/Fluzone >6mos 10/07/2020   • Fluad Quad 65+ 11/03/2021   • Influenza, Unspecified 10/01/2018   • Pneumococcal Conjugate 13-Valent (PCV13) 01/04/2019   • Pneumococcal  Polysaccharide (PPSV23) 03/08/2017   • flucelvax quad pfs =>4 YRS 10/18/2019           REVIEW OF SYSTEMS:    Review of Systems   Constitutional: Positive for malaise/fatigue.   HENT: Negative.    Eyes: Negative.    Cardiovascular: Negative.    Respiratory: Negative.    Endocrine: Negative.    Hematologic/Lymphatic: Negative.    Skin: Negative.    Musculoskeletal: Negative.    Gastrointestinal: Negative.    Genitourinary: Negative.    Neurological: Negative.    Psychiatric/Behavioral: Negative.    Allergic/Immunologic: Negative.        Vital Signs  Temp:  [97.8 °F (36.6 °C)-99 °F (37.2 °C)] 97.9 °F (36.6 °C)  Heart Rate:  [66-96] 72  Resp:  [17-18] 18  BP: (104-149)/(55-83) 125/83          Physical Exam:  Physical Exam  Vitals and nursing note reviewed.   Constitutional:       Appearance: Normal appearance.   HENT:      Head: Normocephalic and atraumatic.      Right Ear: External ear normal.      Left Ear: External ear normal.      Nose: Nose normal.      Mouth/Throat:      Mouth: Mucous membranes are moist.      Pharynx: Oropharynx is clear.   Eyes:      Extraocular Movements: Extraocular movements intact.      Conjunctiva/sclera: Conjunctivae normal.      Pupils: Pupils are equal, round, and reactive to light.   Cardiovascular:      Rate and Rhythm: Normal rate and regular rhythm.      Pulses: Normal pulses.      Heart sounds: Normal heart sounds.   Pulmonary:      Effort: Pulmonary effort is normal.      Breath sounds: Normal breath sounds.   Abdominal:      General: Bowel sounds are normal.      Palpations: Abdomen is soft.   Musculoskeletal:         General: Normal range of motion.      Cervical back: Normal range of motion.   Skin:     General: Skin is warm.      Capillary Refill: Capillary refill takes less than 2 seconds.   Neurological:      General: No focal deficit present.      Mental Status: She is alert and oriented to person, place, and time.   Psychiatric:         Mood and Affect: Mood normal.          Behavior: Behavior normal.         Thought Content: Thought content normal.         Judgment: Judgment normal.         Emotional Behavior:    WNL   Debilities:   none  Results Review:    I reviewed the patient's new clinical results.  Lab Results (most recent)     Procedure Component Value Units Date/Time    Lipid Panel [[id number]]  (Abnormal) Collected: 03/31/23 0512    Specimen: Blood from Arm, Right Updated: 03/31/23 0649     Total Cholesterol 247 mg/dL      Triglycerides 58 mg/dL      HDL Cholesterol 64 mg/dL      LDL Cholesterol  174 mg/dL      VLDL Cholesterol 9 mg/dL      LDL/HDL Ratio 2.68    Narrative:      Cholesterol Reference Ranges  (U.S. Department of Health and Human Services ATP III Classifications)    Desirable          <200 mg/dL  Borderline High    200-239 mg/dL  High Risk          >240 mg/dL      Triglyceride Reference Ranges  (U.S. Department of Health and Human Services ATP III Classifications)    Normal           <150 mg/dL  Borderline High  150-199 mg/dL  High             200-499 mg/dL  Very High        >500 mg/dL    HDL Reference Ranges  (U.S. Department of Health and Human Services ATP III Classifications)    Low     <40 mg/dl (major risk factor for CHD)  High    >60 mg/dl ('negative' risk factor for CHD)        LDL Reference Ranges  (U.S. Department of Health and Human Services ATP III Classifications)    Optimal          <100 mg/dL  Near Optimal     100-129 mg/dL  Borderline High  130-159 mg/dL  High             160-189 mg/dL  Very High        >189 mg/dL    High Sensitivity Troponin T [[id number]]  (Normal) Collected: 03/31/23 0512    Specimen: Blood from Arm, Right Updated: 03/31/23 0632     HS Troponin T <6 ng/L     Narrative:      High Sensitive Troponin T Reference Range:  <10.0 ng/L- Negative Female for AMI  <15.0 ng/L- Negative Male for AMI  >=10 - Abnormal Female indicating possible myocardial injury.  >=15 - Abnormal Male indicating possible myocardial injury.   Clinicians would  have to utilize clinical acumen, EKG, Troponin, and serial changes to determine if it is an Acute Myocardial Infarction or myocardial injury due to an underlying chronic condition.         Basic Metabolic Panel [[id number]]  (Abnormal) Collected: 03/31/23 0512    Specimen: Blood from Arm, Right Updated: 03/31/23 0629     Glucose 101 mg/dL      BUN 15 mg/dL      Creatinine 0.60 mg/dL      Sodium 140 mmol/L      Potassium 4.0 mmol/L      Chloride 104 mmol/L      CO2 26.0 mmol/L      Calcium 9.6 mg/dL      BUN/Creatinine Ratio 25.0     Anion Gap 10.0 mmol/L      eGFR 99.1 mL/min/1.73     Narrative:      GFR Normal >60  Chronic Kidney Disease <60  Kidney Failure <15      Extra Tubes [[id number]] Collected: 03/31/23 0512    Specimen: Blood from Arm, Right Updated: 03/31/23 0615    Narrative:      The following orders were created for panel order Extra Tubes.  Procedure                               Abnormality         Status                     ---------                               -----------         ------                     Light Blue Top[[id number]]                                   Final result                 Please view results for these tests on the individual orders.    Light Blue Top [[id number]] Collected: 03/31/23 0512    Specimen: Blood from Arm, Right Updated: 03/31/23 0615     Extra Tube Hold for add-ons.     Comment: Auto resulted       CBC Auto Differential [[id number]]  (Abnormal) Collected: 03/31/23 0512    Specimen: Blood from Arm, Right Updated: 03/31/23 0611     WBC 7.90 10*3/mm3      RBC 4.67 10*6/mm3      Hemoglobin 13.6 g/dL      Hematocrit 40.9 %      MCV 87.6 fL      MCH 29.2 pg      MCHC 33.3 g/dL      RDW 14.2 %      RDW-SD 45.9 fl      MPV 8.5 fL      Platelets 248 10*3/mm3      Neutrophil % 47.6 %      Lymphocyte % 44.2 %      Monocyte % 6.4 %      Eosinophil % 1.2 %      Basophil % 0.6 %      Neutrophils, Absolute 3.80 10*3/mm3      Lymphocytes, Absolute 3.50 10*3/mm3      Monocytes,  Absolute 0.50 10*3/mm3      Eosinophils, Absolute 0.10 10*3/mm3      Basophils, Absolute 0.00 10*3/mm3      nRBC 0.0 /100 WBC     High Sensitivity Troponin T 2Hr [[id number]] Collected: 03/30/23 2257    Specimen: Blood Updated: 03/30/23 2322     HS Troponin T <6 ng/L      Troponin T Delta --     Comment: Unable to calculate.       Narrative:      High Sensitive Troponin T Reference Range:  <10.0 ng/L- Negative Female for AMI  <15.0 ng/L- Negative Male for AMI  >=10 - Abnormal Female indicating possible myocardial injury.  >=15 - Abnormal Male indicating possible myocardial injury.   Clinicians would have to utilize clinical acumen, EKG, Troponin, and serial changes to determine if it is an Acute Myocardial Infarction or myocardial injury due to an underlying chronic condition.         High Sensitivity Troponin T [[id number]]  (Normal) Collected: 03/30/23 2105    Specimen: Blood Updated: 03/30/23 2132     HS Troponin T <6 ng/L     Narrative:      High Sensitive Troponin T Reference Range:  <10.0 ng/L- Negative Female for AMI  <15.0 ng/L- Negative Male for AMI  >=10 - Abnormal Female indicating possible myocardial injury.  >=15 - Abnormal Male indicating possible myocardial injury.   Clinicians would have to utilize clinical acumen, EKG, Troponin, and serial changes to determine if it is an Acute Myocardial Infarction or myocardial injury due to an underlying chronic condition.         Comprehensive Metabolic Panel [[id number]]  (Abnormal) Collected: 03/30/23 2105    Specimen: Blood Updated: 03/30/23 2129     Glucose 86 mg/dL      BUN 16 mg/dL      Creatinine 0.62 mg/dL      Sodium 141 mmol/L      Potassium 3.9 mmol/L      Chloride 106 mmol/L      CO2 26.0 mmol/L      Calcium 9.5 mg/dL      Total Protein 6.9 g/dL      Albumin 3.9 g/dL      ALT (SGPT) 14 U/L      AST (SGOT) 17 U/L      Alkaline Phosphatase 75 U/L      Total Bilirubin 0.3 mg/dL      Globulin 3.0 gm/dL      A/G Ratio 1.3 g/dL      BUN/Creatinine Ratio 25.8      Anion Gap 9.0 mmol/L      eGFR 98.4 mL/min/1.73     Narrative:      GFR Normal >60  Chronic Kidney Disease <60  Kidney Failure <15      BNP [[id number]]  (Normal) Collected: 03/30/23 2021    Specimen: Blood Updated: 03/30/23 2056     proBNP 48.4 pg/mL     Narrative:      Among patients with dyspnea, NT-proBNP is highly sensitive for the detection of acute congestive heart failure. In addition NT-proBNP of <300 pg/ml effectively rules out acute congestive heart failure with 99% negative predictive value.    Results may be falsely decreased if patient taking Biotin.      CBC & Differential [[id number]]  (Normal) Collected: 03/30/23 2021    Specimen: Blood Updated: 03/30/23 2031    Narrative:      The following orders were created for panel order CBC & Differential.  Procedure                               Abnormality         Status                     ---------                               -----------         ------                     CBC Auto Differential[[id number]]        Normal              Final result                 Please view results for these tests on the individual orders.    CBC Auto Differential [[id number]]  (Normal) Collected: 03/30/23 2021    Specimen: Blood Updated: 03/30/23 2031     WBC 7.70 10*3/mm3      RBC 4.77 10*6/mm3      Hemoglobin 14.0 g/dL      Hematocrit 41.7 %      MCV 87.5 fL      MCH 29.4 pg      MCHC 33.6 g/dL      RDW 14.2 %      RDW-SD 45.9 fl      MPV 7.9 fL      Platelets 262 10*3/mm3      Neutrophil % 62.9 %      Lymphocyte % 29.5 %      Monocyte % 5.9 %      Eosinophil % 0.8 %      Basophil % 0.9 %      Neutrophils, Absolute 4.90 10*3/mm3      Lymphocytes, Absolute 2.30 10*3/mm3      Monocytes, Absolute 0.50 10*3/mm3      Eosinophils, Absolute 0.10 10*3/mm3      Basophils, Absolute 0.10 10*3/mm3      nRBC 0.1 /100 WBC           Imaging Results (Most Recent)     Procedure Component Value Units Date/Time    XR Chest 1 View [[id number]] Collected: 03/30/23 2008     Updated: 03/30/23  2011    Narrative:      XR CHEST 1 VW    Date of Exam: 3/30/2023 7:48 PM EDT    Indication: Chest Pain Protocol  Chest Pain Protocol.    Comparison: None available.    Findings:  The cardiomediastinal silhouette is within normal limits. Lungs are clear. No focal consolidation, pneumothorax, or significant pleural effusion. Osseous structures grossly intact.      Impression:      Impression:  No acute process.    Electronically Signed: Bertin Pinedar    3/30/2023 8:09 PM EDT    Workstation ID: [id number]        reviewed    ECG/EMG Results (most recent)     Procedure Component Value Units Date/Time    ECG 12 Lead Other; palpitations [[id number]] Collected: 03/30/23 1923     Updated: 03/30/23 1924     QT Interval 349 ms     Narrative:      HEART RATE= 85  bpm  RR Interval= 648  ms  NE Interval= 145  ms  P Horizontal Axis= 19  deg  P Front Axis= 50  deg  QRSD Interval= 77  ms  QT Interval= 349  ms  QRS Axis= -15  deg  T Wave Axis= 72  deg  - ABNORMAL ECG -  Sinus rhythm  Sinus pause  Low voltage, precordial leads  Electronically Signed By:   Date and Time of Study: 2023-03-30 19:23:42    SCANNED - TELEMETRY   [[id number]] Resulted: 03/30/23     Updated: 03/31/23 0824    Adult Transthoracic Echo Complete w/ Color, Spectral and Contrast if Necessary Per Protocol [[id number]] Resulted: 03/31/23 1058     Updated: 03/31/23 1059     Target HR (85%) 131 bpm      Max. Pred. HR (100%) 154 bpm      LVIDd 3.1 cm      LVIDs 2.30 cm      IVSd 0.70 cm      LVPWd 0.80 cm      FS 25.8 %      IVS/LVPW 0.88 cm      ESV(cubed) 12.2 ml      LV Sys Vol (BSA corrected) 11.4 cm2      EDV(cubed) 29.8 ml      LV Tariq Vol (BSA corrected) 29.8 cm2      LVOT area 2.27 cm2      LV mass(C)d 56.8 grams      LVOT diam 1.70 cm      EDV(MOD-sp4) 50.2 ml      ESV(MOD-sp4) 19.2 ml      SV(MOD-sp4) 31.0 ml      SI(MOD-sp4) 18.4 ml/m2      EF(MOD-sp4) 61.8 %      MV E max naldo 69.2 cm/sec      MV A max naldo 99.0 cm/sec      MV dec time 0.17 msec      MV E/A 0.70      LA ESV Index (BP) 16.1 ml/m2      Med Peak E' David 7.5 cm/sec      Lat Peak E' David 11.3 cm/sec      Avg E/e' ratio 7.36     SV(LVOT) 45.4 ml      RVIDd 2.20 cm      TAPSE (>1.6) 2.29 cm      RV S' 11.4 cm/sec      LA dimension (2D)  2.30 cm      LV V1 max 102.0 cm/sec      LV V1 max PG 4.2 mmHg      LV V1 mean PG 2.00 mmHg      LV V1 VTI 20.0 cm      Ao pk david 104.0 cm/sec      Ao max PG 4.3 mmHg      Ao mean PG 2.00 mmHg      Ao V2 VTI 22.6 cm      DEACON(I,D) 2.01 cm2      MV max PG 3.7 mmHg      MV mean PG 2.00 mmHg      MV V2 VTI 20.9 cm      MV P1/2t 71.7 msec      MVA(P1/2t) 3.1 cm2      MVA(VTI) 2.17 cm2      MV dec slope 392.0 cm/sec2      RV V1 max PG 1.47 mmHg      RV V1 max 60.7 cm/sec      RV V1 VTI 12.4 cm      PA V2 max 79.4 cm/sec      ACS 1.80 cm      Sinus 2.7 cm      EF(MOD-bp) 62.0 %         reviewed    Results for orders placed during the hospital encounter of 10/13/22    Vascular screening (bundle) CAR    Interpretation Summary  •  Normal screening vascular ultrasound study          Microbiology Results (last 10 days)     ** No results found for the last 240 hours. **          Assessment & Plan     Near syncope     Near syncope  -Troponins negative  -BNP 48.8  -CBC and CMP unremarkable  -Chest x-ray showed no acute cardiopulmonary process  -EKG shows sinus rhythm with heart rate 85  -Total cholesterol 247, HDL 64, , triglycerides 58  -Myoview showed no ischemia and low risk study  -Echocardiogram ordered evaluation by cardiology  -Cardiology consulted      I discussed the patients findings and my recommendations with patient and family.     Discharge Diagnosis:      Near syncope      Hospital Course  Patient is a 67 y.o. female presented with near syncope.  Patient stated yesterday before presenting to the ED she felt palpitations, lightheadedness and flushed feeling as if she was going to pass out.  Patient denies loss of consciousness or hitting her head.  Patient denies unilateral weakness  or loss of bowel or bladder function.  Patient states she does not take any medications and is not on any new medications.  Denies ill known contacts.  CBC and CMP unremarkable.  Troponins negative.  BNP 48.8.  Chest x-ray showed no acute cardiopulmonary process.  EKG showed sinus rhythm.  Total cholesterol slightly elevated at 247, HDL 64, , triglycerides 58.  Patient declined cholesterol medication at this time as it has decreased and she is working on lifestyle modifications.  Cardiology consulted and reviewed test results with patient.  Myoview showed no ischemia low risk study.  Echocardiogram ordered evaluated by cardiology.  Patient to have Mcot placed at discharge.  Patient to follow-up with cardiology in 1 month.  Patient to follow-up PCP in 1 week for continued care management.  Tests and recommendations reviewed patient and she agreed with treatment plan.  If symptoms worsen patient to call 911 or go to nearest ED.    Past Medical History:     Past Medical History:   Diagnosis Date   • Allergic    • Colon polyp    • Heart murmur    • Osteopenia        Past Surgical History:     Past Surgical History:   Procedure Laterality Date   • COLONOSCOPY         Social History:   Social History     Socioeconomic History   • Marital status:    Tobacco Use   • Smoking status: Never     Passive exposure: Never   • Smokeless tobacco: Never   Vaping Use   • Vaping Use: Never used   Substance and Sexual Activity   • Alcohol use: Never   • Drug use: Never   • Sexual activity: Not Currently       Procedures Performed         Consults:   Consults     Date and Time Order Name Status Description    3/30/2023 11:35 PM Inpatient Cardiology Consult Completed           Condition on Discharge:     Stable    Discharge Disposition  Home or Self Care    Discharge Medications     Discharge Medications      Patient Not Prescribed Medications Upon Discharge         Discharge Diet:   Diet Instructions     Diet: Cardiac  Diets; Healthy Heart (2-3 Na+); Texture: Regular Texture (IDDSI 7); Fluid Consistency: Thin (IDDSI 0)      Discharge Diet: Cardiac Diets    Cardiac Diet: Healthy Heart (2-3 Na+)    Texture: Regular Texture (IDDSI 7)    Fluid Consistency: Thin (IDDSI 0)          Activity at Discharge:   Activity Instructions     Activity as Tolerated      Measure Blood Pressure            Follow-up Appointments  No future appointments.  Additional Instructions for the Follow-ups that You Need to Schedule     Discharge Follow-up with PCP   As directed       Currently Documented PCP:    Cherry Yadav MD    PCP Phone Number:    [phone]     Follow Up Details: 7-10 days               Test Results Pending at Discharge       Risk for Readmission (LACE) Score: 1 (3/31/2023  6:00 AM)          ERIN Ugalde  03/31/23  11:33 EDT

## 2023-04-01 LAB — QT INTERVAL: 349 MS

## 2023-04-03 ENCOUNTER — TRANSITIONAL CARE MANAGEMENT TELEPHONE ENCOUNTER (OUTPATIENT)
Dept: CALL CENTER | Facility: HOSPITAL | Age: 68
End: 2023-04-03
Payer: MEDICARE

## 2023-04-03 NOTE — OUTREACH NOTE
Call Center TCM Note    Flowsheet Row Responses   [facility] [place], operated by [facility] patient discharged from? [place]   Does the patient have one of the following disease processes/diagnoses(primary or secondary)? Other   TCM attempt successful? Yes   Call start time 1441   Call end time 1446   Discharge diagnosis Near syncope   Person spoke with today (if not patient) and relationship    Meds reviewed with patient/caregiver? Yes   Is the patient having any side effects they believe may be caused by any medication additions or changes? No   Does the patient have all medications ordered at discharge? Yes   Is the patient taking all medications as directed (includes completed medication regime)? Yes   Comments Declined scheduling at this time.    Does the patient have an appointment with their PCP within 7 days of discharge? No   Nursing Interventions Patient declined scheduling/rescheduling appointment at this time   Psychosocial issues? No   Did the patient receive a copy of their discharge instructions? Yes   Nursing interventions Reviewed instructions with patient   What is the patient's perception of their health status since discharge? Improving  [Palpitations have only happened a couple of times since discharge. Patient is wearing a heart monitor. ]   Is the patient/caregiver able to teach back signs and symptoms related to disease process for when to call PCP? Yes   Is the patient/caregiver able to teach back signs and symptoms related to disease process for when to call 911? Yes   Is the patient/caregiver able to teach back the hierarchy of who to call/visit for symptoms/problems? PCP, Specialist, Home health nurse, Urgent Care, ED, 911 Yes   If the patient is a current smoker, are they able to teach back resources for cessation? Not a smoker   TCM call completed? Yes   Call end time 1446   Would this patient benefit from a Referral to [facility] Social Work? No   Is the patient interested in additional calls from an ambulatory  ?  NOTE:  applies to high risk patients requiring additional follow-up. No          Gt Zhong RN    4/3/2023, 14:47 EDT

## 2023-04-05 ENCOUNTER — OFFICE VISIT (OUTPATIENT)
Dept: FAMILY MEDICINE CLINIC | Facility: CLINIC | Age: 68
End: 2023-04-05
Payer: MEDICARE

## 2023-04-05 VITALS
RESPIRATION RATE: 16 BRPM | OXYGEN SATURATION: 97 % | HEART RATE: 83 BPM | BODY MASS INDEX: 29.04 KG/M2 | WEIGHT: 153.8 LBS | DIASTOLIC BLOOD PRESSURE: 78 MMHG | HEIGHT: 61 IN | SYSTOLIC BLOOD PRESSURE: 124 MMHG

## 2023-04-05 DIAGNOSIS — Z09 HOSPITAL DISCHARGE FOLLOW-UP: ICD-10-CM

## 2023-04-05 DIAGNOSIS — R00.2 PALPITATIONS: Primary | ICD-10-CM

## 2023-04-05 DIAGNOSIS — E78.41 ELEVATED LIPOPROTEIN(A): ICD-10-CM

## 2023-04-05 PROCEDURE — 1160F RVW MEDS BY RX/DR IN RCRD: CPT | Performed by: INTERNAL MEDICINE

## 2023-04-05 PROCEDURE — 1111F DSCHRG MED/CURRENT MED MERGE: CPT | Performed by: INTERNAL MEDICINE

## 2023-04-05 PROCEDURE — 99495 TRANSJ CARE MGMT MOD F2F 14D: CPT | Performed by: INTERNAL MEDICINE

## 2023-04-05 PROCEDURE — 1159F MED LIST DOCD IN RCRD: CPT | Performed by: INTERNAL MEDICINE

## 2023-04-05 NOTE — PROGRESS NOTES
Transitional Care Follow Up Visit  Subjective     Ave Hardin is a 67 y.o. female who presents for a transitional care management visit.    Within 48 business hours after discharge our office contacted her via telephone to coordinate her care and needs.      I reviewed and discussed the details of that call along with the discharge summary, hospital problems, inpatient lab results, inpatient diagnostic studies, and consultation reports with Ave.     Current outpatient and discharge medications have been reconciled for the patient.  Reviewed by: Rod Brasher MD      Date of TCM Phone Call 3/31/2023   [facility]   Date of Admission 3/30/2023   Date of Discharge 3/31/2023   Discharge Disposition Home or Self Care     Risk for Readmission (LACE) Score: 1 (3/31/2023  6:00 AM)      History of Present Illness   Course During Hospital Stay:   Went into [facility] for palpitations on 3/30. Had palpitations for roughly 24 hrs. Felt lightheadedness and almost syncope while driving to see family. Went to the ER and admitted for a day. Underwent full cardiac workup including echo, stress test ECG, and troponins. Reviewing the chart, she had a relatively normal echo, negative stress test and troponins negative. ECG showed a sinus pause. Cardiology saw her in the hospital. They placed a MCOT monitor and will have follow up with cardiology at the end of this month. She has occasionally felt the palpitations and skip beats since being discharged at but only occurring once a day now, compared to the constant when she went in. This is her baseline. She drinks one coffee a day. Has had some increase familial stress.     Her cholesterol was up in the hospital. With elevated LDL to 170s. She had a normal CCS back in 2022. She does not want to be on lipid medications at this time.      The following portions of the patient's history were reviewed and updated as appropriate: allergies, current medications,  past family history, past medical history, past social history, past surgical history and problem list.    Review of Systems   Constitutional: Negative for diaphoresis and fatigue.   Respiratory: Negative for cough and shortness of breath.    Cardiovascular: Positive for palpitations. Negative for chest pain and leg swelling.   Gastrointestinal: Negative for abdominal distention, diarrhea and nausea.       Objective   Physical Exam  Constitutional:       Appearance: Normal appearance.   HENT:      Mouth/Throat:      Mouth: Mucous membranes are moist.   Cardiovascular:      Rate and Rhythm: Normal rate and regular rhythm.      Pulses: Normal pulses.      Heart sounds: No murmur heard.  Pulmonary:      Effort: Pulmonary effort is normal. No respiratory distress.      Breath sounds: Normal breath sounds. No wheezing.   Abdominal:      General: Abdomen is flat. Bowel sounds are normal. There is no distension.      Palpations: Abdomen is soft.   Skin:     Capillary Refill: Capillary refill takes less than 2 seconds.   Neurological:      Mental Status: She is alert.         Assessment & Plan   Diagnoses and all orders for this visit:    1. Palpitations (Primary)    2. Elevated lipoprotein(a)    Patient is overall well today and back to her baseline with her feeling of palpitations. She will follow up with cardiology and discuss the findings of the monitor. She is likely having PVCs and PACs associated with stress. She may need a beta blocker depending on results    Discussed Lipids. Will continue to monitor cholesterol and see if she may need a statin.     The 10-year ASCVD risk score (Marycarmen ZAMORA, et al., 2019) is: 6.1%    Values used to calculate the score:      Age: 67 years      Sex: Female      Is Non- : No      Diabetic: No      Tobacco smoker: No      Systolic Blood Pressure: 124 mmHg      Is BP treated: No      HDL Cholesterol: 64 mg/dL      Total Cholesterol: 247 mg/dL     They were informed  of the diagnosis and treatment plan and directed to f/u for any further problems or concerns.    I have seen and examined Ave Hardin with resident, Dr. Brasher and agree with findings and assessment and plan- discussed anxiety/stress with patient and she agrees to decrease going to visit family and work on meditation- will reach out if not getting better and start medication. also was worried about benign skin lesion    This document has been electronically signed by Cherry Yadav MD on April 16, 2023 21:20 EDT

## 2023-04-05 NOTE — PROGRESS NOTES
Transitional Care Follow Up Visit  Subjective     Ave Hardin is a 67 y.o. female who presents for a transitional care management visit.    Within 48 business hours after discharge our office contacted her via telephone to coordinate her care and needs.      I reviewed and discussed the details of that call along with the discharge summary, hospital problems, inpatient lab results, inpatient diagnostic studies, and consultation reports with Ave.     Current outpatient and discharge medications have been reconciled for the patient.  Reviewed by: Lizette Tatum RN      Date of TCM Phone Call 3/31/2023   [facility]   Date of Admission 3/30/2023   Date of Discharge 3/31/2023   Discharge Disposition Home or Self Care     Risk for Readmission (LACE) Score: 1 (3/31/2023  6:00 AM)      History of Present Illness   Course During Hospital Stay:  ***     {Common H&P Review Areas:93255}    Review of Systems    Objective   Physical Exam    Assessment & Plan   {Assess/PlanSmartLinks:17213}        The 10-year ASCVD risk score (Marycarmen ZAMORA, et al., 2019) is: 6.1%    Values used to calculate the score:      Age: 67 years      Sex: Female      Is Non- : No      Diabetic: No      Tobacco smoker: No      Systolic Blood Pressure: 124 mmHg      Is BP treated: No      HDL Cholesterol: 64 mg/dL      Total Cholesterol: 247 mg/dL

## 2023-04-16 PROBLEM — Z09 HOSPITAL DISCHARGE FOLLOW-UP: Status: ACTIVE | Noted: 2023-04-16

## 2023-04-16 PROBLEM — R00.2 PALPITATIONS: Status: ACTIVE | Noted: 2023-04-16

## 2023-04-21 ENCOUNTER — TELEPHONE (OUTPATIENT)
Dept: FAMILY MEDICINE CLINIC | Facility: CLINIC | Age: 68
End: 2023-04-21
Payer: MEDICARE

## 2023-04-21 RX ORDER — CIPROFLOXACIN HYDROCHLORIDE 3.5 MG/ML
1 SOLUTION/ DROPS TOPICAL 2 TIMES DAILY
Qty: 5 ML | Refills: 0 | Status: SHIPPED | OUTPATIENT
Start: 2023-04-21

## 2023-04-21 NOTE — TELEPHONE ENCOUNTER
Caller: Ave Hardin    Relationship: Self    Best call back number: [phone]    What medication are you requesting: MEDICATION FOR A STY    What are your current symptoms:     How long have you been experiencing symptoms:     Have you had these symptoms before:    [x] Yes  [] No    Have you been treated for these symptoms before:   [x] Yes  [] No    If a prescription is needed, what is your preferred pharmacy and phone number: [facility] [id number] - [place], [place] - [address] - [phone]  - [phone]      Additional notes:

## 2023-04-24 ENCOUNTER — OFFICE VISIT (OUTPATIENT)
Dept: FAMILY MEDICINE CLINIC | Facility: CLINIC | Age: 68
End: 2023-04-24
Payer: MEDICARE

## 2023-04-24 VITALS
SYSTOLIC BLOOD PRESSURE: 118 MMHG | RESPIRATION RATE: 18 BRPM | OXYGEN SATURATION: 97 % | HEIGHT: 61 IN | HEART RATE: 83 BPM | DIASTOLIC BLOOD PRESSURE: 80 MMHG | BODY MASS INDEX: 29.27 KG/M2 | WEIGHT: 155 LBS

## 2023-04-24 DIAGNOSIS — H00.014 HORDEOLUM EXTERNUM OF LEFT UPPER EYELID: Primary | ICD-10-CM

## 2023-04-24 PROCEDURE — 1160F RVW MEDS BY RX/DR IN RCRD: CPT | Performed by: NURSE PRACTITIONER

## 2023-04-24 PROCEDURE — 99213 OFFICE O/P EST LOW 20 MIN: CPT | Performed by: NURSE PRACTITIONER

## 2023-04-24 PROCEDURE — 1159F MED LIST DOCD IN RCRD: CPT | Performed by: NURSE PRACTITIONER

## 2023-04-24 NOTE — PROGRESS NOTES
"Answers for HPI/ROS submitted by the patient on 4/22/2023  Please describe your symptoms.: I have had several eye styes since August 2022.  I had only had 1 or 2 in my entire life, prior to that. My eyelids have become increasingly droopy in the past year. I am wondering if this might be contributing to the styes or what else might be contributing to this onset of styes.  Have you had these symptoms before?: Yes  How long have you been having these symptoms?: Greater than 2 weeks  Please list any medications you are currently taking for this condition.: I am on no medications.  Please describe any probable cause for these symptoms. : Droopy eyelids  What is the primary reason for your visit?: Other    Chief Complaint  Stye    Subjective          Ave IHSAN Wilfred presents to [facility]  History of Present Illness    Is here today with concerns about having styes in her eyes    She tells me that in August she got a stye in her eye which was treated with antibiotic cream from the [facility] clinic  Now she tells me that she has had about 4 more styes  She is having some drooping of her eyelids which has worsened    Review of Systems   Constitutional: Negative.  Negative for appetite change, fatigue and fever.   Eyes: Positive for pain and redness. Negative for discharge, itching and visual disturbance.        Stye of the left upper inner eye lid   Respiratory: Negative.    Cardiovascular: Negative.    Gastrointestinal: Negative.    Genitourinary: Negative.    Musculoskeletal: Negative.    Neurological: Negative.    Psychiatric/Behavioral: Negative.      Objective   Vital Signs:  /80 (BP Location: Right arm, Patient Position: Sitting)   Pulse 83   Resp 18   Ht 154.9 cm (60.98\")   Wt 70.3 kg (155 lb)   SpO2 97%   BMI 29.30 kg/m²     BP Readings from Last 3 Encounters:   04/24/23 118/80   04/05/23 124/78   03/31/23 125/83        Wt Readings from Last 3 Encounters:   04/24/23 70.3 " kg (155 lb)   04/05/23 69.8 kg (153 lb 12.8 oz)   03/31/23 69.4 kg (153 lb)              Physical Exam  Vitals reviewed.   Eyes:      Comments: Stye left upper inner lid, is erythematous   Cardiovascular:      Rate and Rhythm: Normal rate.   Pulmonary:      Effort: Pulmonary effort is normal.   Skin:     General: Skin is warm.   Neurological:      Mental Status: She is alert and oriented to person, place, and time.   Psychiatric:         Mood and Affect: Mood normal.        Result Review :                 Assessment and Plan    Diagnoses and all orders for this visit:    1. Hordeolum externum of left upper eyelid (Primary)    reviewed management of styes  May want to consider blepharplasty         Follow Up   Return for Next scheduled follow up.  Patient was given instructions and counseling regarding her condition or for health maintenance advice. Please see specific information pulled into the AVS if appropriate.

## 2023-05-02 LAB
Lab: 112
TOAL ENROLLMENT DAYS: 30

## 2023-05-15 ENCOUNTER — OFFICE VISIT (OUTPATIENT)
Dept: CARDIOLOGY | Facility: CLINIC | Age: 68
End: 2023-05-15
Payer: MEDICARE

## 2023-05-15 VITALS
WEIGHT: 154 LBS | SYSTOLIC BLOOD PRESSURE: 128 MMHG | HEART RATE: 72 BPM | BODY MASS INDEX: 29.07 KG/M2 | HEIGHT: 61 IN | DIASTOLIC BLOOD PRESSURE: 53 MMHG | OXYGEN SATURATION: 99 %

## 2023-05-15 DIAGNOSIS — R55 NEAR SYNCOPE: ICD-10-CM

## 2023-05-15 DIAGNOSIS — I50.32 CHRONIC HEART FAILURE WITH PRESERVED EJECTION FRACTION (HFPEF): ICD-10-CM

## 2023-05-15 DIAGNOSIS — E78.2 MIXED HYPERLIPIDEMIA: ICD-10-CM

## 2023-05-15 DIAGNOSIS — R00.2 PALPITATIONS: Primary | ICD-10-CM

## 2023-05-15 DIAGNOSIS — Z00.00 ENCOUNTER FOR PREVENTIVE CARE: ICD-10-CM

## 2023-05-15 RX ORDER — ROSUVASTATIN CALCIUM 10 MG/1
10 TABLET, COATED ORAL NIGHTLY
Qty: 90 TABLET | Refills: 3 | Status: SHIPPED | OUTPATIENT
Start: 2023-05-15

## 2023-05-15 NOTE — PROGRESS NOTES
Encounter Date:05/15/2023        Patient ID: Ave Hardin is a 67 y.o. female.      Chief Complaint:      History of Present Illness  67 years old pleasant woman presented to the hospital in March 2023 with palpitations and near syncope.  Her labs were essentially unremarkable including negative troponin, negative proBNP.  Echocardiogram showed EF of 62%, grade 1 diastolic dysfunction and no significant valvular abnormalities.  A nuclear stress test was negative for ischemia.  She was recommended to be on a statin due to LDL of 174 however patient was reluctant to do so.  Of note her calcium score from 2022 is 0.  Carotid arteries are also normal.  She was given MCOT at the time of discharge and recommended to undergo repeat lipid panel in 3 months.  Today she comes in for follow-up and has multiple questions.  Her questions are related to mitral valve prolapse, ideal weight, Takotsubo cardiomyopathy, palpitations    The following portions of the patient's history were reviewed and updated as appropriate: allergies, current medications, past family history, past medical history, past social history, past surgical history and problem list.    Review of Systems   Constitutional: Negative for malaise/fatigue.   Cardiovascular: Positive for palpitations. Negative for chest pain, dyspnea on exertion and leg swelling.   Respiratory: Negative for cough and shortness of breath.    Gastrointestinal: Negative for abdominal pain, nausea and vomiting.   Neurological: Positive for dizziness and numbness. Negative for focal weakness, headaches and light-headedness.   All other systems reviewed and are negative.        Current Outpatient Medications:   •  rosuvastatin (CRESTOR) 10 MG tablet, Take 1 tablet by mouth Every Night., Disp: 90 tablet, Rfl: 3    Current outpatient and discharge medications have been reconciled for the patient.  Reviewed by: Joaquín Zepeda MD       Allergies   Allergen Reactions   • Sulfa Antibiotics  Hives   • Latex Rash       Family History   Problem Relation Age of Onset   • Stroke Mother    • Dementia Mother    • Depression Mother    • Hearing loss Mother         Otosclerosis   • Hyperlipidemia Mother    • Vision loss Mother         Wet advanced macular degenerative disease   • Other Mother         Alzheimers - current   • Hypertension Mother         Has been on meds for high BP for years   • Cancer Father         Lung cancer   • Other Father         Alzheimers -    • Alcohol abuse Maternal Grandfather    • Early death Maternal Grandfather         Heart attack at 49 yr old   • Heart disease Maternal Grandfather          of heart attack at 49 yr old   • Heart attack Maternal Grandfather          of heart attack at age 50 yr   • Cancer Maternal Grandmother         Mouth cancer   • Diabetes Paternal Grandmother    • Other Paternal Grandmother         Alzheimers -    • Alcohol abuse Maternal Aunt    • Alcohol abuse Maternal Uncle    • Early death Maternal Uncle          in his 50's - alcoholism   • Liver disease Maternal Uncle         Alcoholic for many years   • Anxiety disorder Sister    • Cancer Sister         Colon cancer   • COPD Sister    • Depression Sister    • Cancer Maternal Aunt         Stomach cancer   • Diabetes Brother    • Hypertension Brother        Past Surgical History:   Procedure Laterality Date   • COLONOSCOPY         Past Medical History:   Diagnosis Date   • Allergic    • Colon polyp    • Heart murmur    • HL (hearing loss)     Seeing a ENT physician for this   • Hyperlipidemia     Attempting to lower through diet & exercise   • Infectious viral hepatitis    • Mitral valve prolapse    • Obesity    • Osteopenia        Family History   Problem Relation Age of Onset   • Stroke Mother    • Dementia Mother    • Depression Mother    • Hearing loss Mother         Otosclerosis   • Hyperlipidemia Mother    • Vision loss Mother         Wet advanced macular  "degenerative disease   • Other Mother         Alzheimers - current   • Hypertension Mother         Has been on meds for high BP for years   • Cancer Father         Lung cancer   • Other Father         Alzheimers -    • Alcohol abuse Maternal Grandfather    • Early death Maternal Grandfather         Heart attack at 49 yr old   • Heart disease Maternal Grandfather          of heart attack at 49 yr old   • Heart attack Maternal Grandfather          of heart attack at age 50 yr   • Cancer Maternal Grandmother         Mouth cancer   • Diabetes Paternal Grandmother    • Other Paternal Grandmother         Alzheimers -    • Alcohol abuse Maternal Aunt    • Alcohol abuse Maternal Uncle    • Early death Maternal Uncle          in his 50's - alcoholism   • Liver disease Maternal Uncle         Alcoholic for many years   • Anxiety disorder Sister    • Cancer Sister         Colon cancer   • COPD Sister    • Depression Sister    • Cancer Maternal Aunt         Stomach cancer   • Diabetes Brother    • Hypertension Brother        Social History     Socioeconomic History   • Marital status:    Tobacco Use   • Smoking status: Never     Passive exposure: Never   • Smokeless tobacco: Never   Vaping Use   • Vaping Use: Never used   Substance and Sexual Activity   • Alcohol use: Never   • Drug use: Never   • Sexual activity: Not Currently     Partners: Male               Objective:       Physical Exam    /53 (BP Location: Left arm, Patient Position: Sitting, Cuff Size: Large Adult)   Pulse 72   Ht 154.9 cm (61\")   Wt 69.9 kg (154 lb)   SpO2 99%   BMI 29.10 kg/m²   The patient is alert, oriented and in no distress.    Vital signs as noted above.    Head and neck revealed no carotid bruits or jugular venous distension.  No thyromegaly or lymphadenopathy is present.    Lungs clear.  No wheezing.  Breath sounds are normal bilaterally.    Heart normal first and second heart sounds.  No murmur..  No " pericardial rub is present.  No gallop is present.    Abdomen soft and nontender.  No organomegaly is present.    Extremities revealed good peripheral pulses without any pedal edema.    Skin warm and dry.    Musculoskeletal system is grossly normal.    CNS grossly normal.           Diagnosis Plan   1. Palpitations        2. Near syncope        3. Chronic heart failure with preserved ejection fraction (HFpEF)        4. Mixed hyperlipidemia  rosuvastatin (CRESTOR) 10 MG tablet      5. Encounter for preventive care        LAB RESULTS (LAST 7 DAYS)    CBC        BMP        CMP         BNP        TROPONIN        CoAg        Creatinine Clearance  CrCl cannot be calculated (Patient's most recent lab result is older than the maximum 30 days allowed.).    ABG        Radiology  No radiology results for the last day    EKG  Procedures    Stress test  Results for orders placed during the hospital encounter of 03/30/23    Stress Test With Myocardial Perfusion One Day    Interpretation Summary  •  Findings consistent with a normal ECG stress test.  •  Moderate risk for ischemic heart disease.  •  Myocardial perfusion imaging indicates a normal myocardial perfusion study with no evidence of ischemia.  •  Impressions are consistent with a low risk study.      Echocardiogram  Results for orders placed during the hospital encounter of 03/30/23    Adult Transthoracic Echo Complete w/ Color, Spectral and Contrast if Necessary Per Protocol    Interpretation Summary  •  Left ventricular systolic function is normal. Calculated left ventricular EF = 62% Left ventricular ejection fraction appears to be 61 - 65%.  •  Left ventricular diastolic function is consistent with (grade I) impaired relaxation.  •  No significant valvular abnormalities.      Cardiac catheterization  No results found for this or any previous visit.          Assessment and Plan       Diagnoses and all orders for this visit:    1. Palpitations (Primary)    2. Near  syncope    3. Chronic heart failure with preserved ejection fraction (HFpEF)    4. Mixed hyperlipidemia  -     rosuvastatin (CRESTOR) 10 MG tablet; Take 1 tablet by mouth Every Night.  Dispense: 90 tablet; Refill: 3    5. Encounter for preventive care    Syncope  Her labs are essentially unremarkable including negative troponin, negative proBNP.  ECG shows normal sinus rhythm and there have been no events on telemetry overnight.  Echocardiogram shows EF of 62%, grade 1 diastolic dysfunction and no significant valvular abnormalities.  A nuclear stress test is negative for ischemia.  Normal carotid arteries.  I have provided reassurance to the patient.    HFpEF  Echocardiogram shows EF of 62%, grade 1 diastolic dysfunction and no significant valvular abnormalities.  A nuclear stress test is negative for ischemia.    Hyperlipidemia   , HDL 64, triglyceride 58 and total cholesterol 247.  After lengthy discussion the patient has agreed to be started on Crestor 10 mg p.o. daily.  Possible side effects of Crestor were discussed with her    Preventive care  Fortunately her coronary artery calcium score is 0 from 2022.  Carotid arteries are also normal.  Diet modification and weight loss discussed with the patient.  Plan to repeat lipid panel in 3 months    Palpitations  MCOT shows average heart rate of 80 with no evidence of atrial fibrillation or pauses.  1% PVCs and PACs.  One 6 beat run of SVT.  Reassurance provided to the patient.  No indication for beta-blocker at the moment    Overweight  Her BMI is 29.1  I discussed that her ideal weight is less than 132 pounds  She will be starting weight watchers next week    Majority of the time during this visit was spent in education and counseling.

## 2023-09-06 ENCOUNTER — TELEPHONE (OUTPATIENT)
Dept: FAMILY MEDICINE CLINIC | Facility: CLINIC | Age: 68
End: 2023-09-06

## 2023-09-06 DIAGNOSIS — R73.09 OTHER ABNORMAL GLUCOSE: ICD-10-CM

## 2023-09-06 DIAGNOSIS — Z00.00 ENCOUNTER FOR GENERAL ADULT MEDICAL EXAMINATION WITHOUT ABNORMAL FINDINGS: ICD-10-CM

## 2023-09-06 DIAGNOSIS — E78.41 ELEVATED LIPOPROTEIN(A): Primary | ICD-10-CM

## 2023-09-06 DIAGNOSIS — E66.3 OVERWEIGHT (BMI 25.0-29.9): ICD-10-CM

## 2023-09-06 PROBLEM — Z86.010 HISTORY OF COLONIC POLYPS: Status: ACTIVE | Noted: 2023-09-06

## 2023-09-06 PROBLEM — Z86.0100 HISTORY OF COLONIC POLYPS: Status: ACTIVE | Noted: 2023-09-06

## 2023-09-06 NOTE — TELEPHONE ENCOUNTER
Caller: Ave Haridn    Relationship: Self    Best call back number: [phone]     PATIENT WOULD LIKE A CALL TO DISCUSS IF SHE NEEDS TO HAVE FASTING LABS BEFORE HER 9/17 NEW PATIENT VISIT WITH DR CLEMONS

## 2023-09-12 ENCOUNTER — LAB (OUTPATIENT)
Dept: FAMILY MEDICINE CLINIC | Facility: CLINIC | Age: 68
End: 2023-09-12
Payer: MEDICARE

## 2023-09-12 DIAGNOSIS — E78.41 ELEVATED LIPOPROTEIN(A): ICD-10-CM

## 2023-09-12 DIAGNOSIS — Z00.00 ENCOUNTER FOR GENERAL ADULT MEDICAL EXAMINATION WITHOUT ABNORMAL FINDINGS: ICD-10-CM

## 2023-09-12 DIAGNOSIS — E66.3 OVERWEIGHT (BMI 25.0-29.9): ICD-10-CM

## 2023-09-12 DIAGNOSIS — R73.09 OTHER ABNORMAL GLUCOSE: ICD-10-CM

## 2023-09-12 PROCEDURE — 80061 LIPID PANEL: CPT | Performed by: STUDENT IN AN ORGANIZED HEALTH CARE EDUCATION/TRAINING PROGRAM

## 2023-09-12 PROCEDURE — 86803 HEPATITIS C AB TEST: CPT | Performed by: STUDENT IN AN ORGANIZED HEALTH CARE EDUCATION/TRAINING PROGRAM

## 2023-09-12 PROCEDURE — 83036 HEMOGLOBIN GLYCOSYLATED A1C: CPT | Performed by: STUDENT IN AN ORGANIZED HEALTH CARE EDUCATION/TRAINING PROGRAM

## 2023-09-12 PROCEDURE — 36415 COLL VENOUS BLD VENIPUNCTURE: CPT

## 2023-09-13 LAB
CHOLEST SERPL-MCNC: 131 MG/DL (ref 0–200)
HBA1C MFR BLD: 5.9 % (ref 4.8–5.6)
HCV AB SER DONR QL: NORMAL
HDLC SERPL-MCNC: 52 MG/DL (ref 40–60)
LDLC SERPL CALC-MCNC: 68 MG/DL (ref 0–100)
LDLC/HDLC SERPL: 1.34 {RATIO}
TRIGL SERPL-MCNC: 46 MG/DL (ref 0–150)
VLDLC SERPL-MCNC: 11 MG/DL (ref 5–40)

## 2023-09-14 ENCOUNTER — OFFICE VISIT (OUTPATIENT)
Dept: FAMILY MEDICINE CLINIC | Facility: CLINIC | Age: 68
End: 2023-09-14
Payer: MEDICARE

## 2023-09-14 VITALS
OXYGEN SATURATION: 98 % | SYSTOLIC BLOOD PRESSURE: 118 MMHG | RESPIRATION RATE: 16 BRPM | WEIGHT: 145.2 LBS | HEART RATE: 77 BPM | BODY MASS INDEX: 27.41 KG/M2 | HEIGHT: 61 IN | DIASTOLIC BLOOD PRESSURE: 76 MMHG

## 2023-09-14 DIAGNOSIS — E78.00 PURE HYPERCHOLESTEROLEMIA: Primary | Chronic | ICD-10-CM

## 2023-09-14 DIAGNOSIS — R73.03 PREDIABETES: ICD-10-CM

## 2023-09-14 DIAGNOSIS — I50.32 CHRONIC HEART FAILURE WITH PRESERVED EJECTION FRACTION: Chronic | ICD-10-CM

## 2023-09-14 RX ORDER — ERYTHROMYCIN 5 MG/G
OINTMENT OPHTHALMIC
COMMUNITY
Start: 2023-09-11 | End: 2023-09-14

## 2023-09-14 NOTE — PROGRESS NOTES
"[facility]   Established Patient Visit    Patient Name:Ave Hardin  : 1955  MRN: [id number]  Primary Care Physician: Katty Jones MD    Subjective    SUBJECTIVE     Chief Complaint  Chief Complaint   Patient presents with    \[facility]\"" Care     CMM PT       History of Present Illness  Ave Hardin is a 67 y.o. female who presents to clinic for follow up of chronic conditions. She was previously a patient of Dr. Yadav and is here to establish with me as her new PCP.     HLD  Currently taking rosuvastatin, adherent with medication, tolerating without side effects. Has implemented low fat, low cholesterol diet. Impressed with how much her cholesterol improved after starting the medication. Denies muscle aches, jaundice, CP, SOB    HFpEF  Follows with cardiology, last seen by Dr. Zepeda on 5/15/23. No current medications. Currently asymptomatic, no impairment in daily functioning or exercise.     UoL to [employer]: is going to have eyelid surgery (eyelid lifting and excessive skin removal),  (R eye); has hx of styes and was seeing optho    Review of Systems  Pertinent symptoms noted in HPI, otherwise a brief disease specific review of symptoms was negative.       Healthcare Maintenance   Last AWV was 22    Patient History   The following portions of the patient's history were reviewed and updated as appropriate:   allergies, current medications, problem list, PMHx, PSHx, PFHx, past social history     Objective   OBJECTIVE     Vitals:    23 1257   BP: 118/76   BP Location: Left arm   Pulse: 77   Resp: 16   SpO2: 98%   Weight: 65.9 kg (145 lb 3.2 oz)   Height: 154.9 cm (60.98\")      BMI Readings from Last 3 Encounters:   23 27.45 kg/m²   05/15/23 29.10 kg/m²   23 29.30 kg/m²        Physical Exam   Constitutional: Alert, well-appearing, no acute distress  HENT: NCAT, mucous membranes moist  Neck: Supple, no thyromegaly  Respiratory: " No respiratory distress, good effort and air entry, clear to auscultation bilaterally   Cardiovascular: RRR, no LE edema  Psychiatric: Appropriate mood and affect, cooperative  Skin: No apparent rashes or lesions      Data/Results/Imaging   {Result Review :  I ordered a Lipid Panel, Hgb A1c, and Hep C prior to today's appointment that was obtained on 9/12 and reviewed with patient today. Results show improved lipid panel with cholesterol levels at goal, Hgb A1c slightly elevated in prediabetic range, and nonreactive Hep C.        Assessment    ASSESSMENT & PLAN       Problem List Items Addressed This Visit     HLD  chronic, well controlled  Assessment: Lipid panel ordered prior to appointment and obtained on 9/12. Reviewed today and shows LDL, HDL, and triglycerides all well controlled with appropriate reduction in LDL since starting statin.   PLAN:  Continue current regimen with Crestor 10 mg daily. Reiterated importance of continued therapeutic lifestyle recommendations. Patient curious at what point she can try coming off medication if she improves and maintains her diet and exercise habits. Discussed that we could discuss this after a year of medication therapy and if dose decreased/discontinued, we would keep a close eye on her cholesterol afterwards to ensure no rebound increase. Patient agreed with plan.     HFpEF  chronic, stable  Assessment: Follows with cardiologist, Dr. Zepeda. ECHO from 3/30/23 with normal LVEF of 62% with grade 1 LV diastolic dysfunction. Had nuclear stress test that was negative for ischemia. CAC score from 2022 was zero. Currently asymptomatic.   PLAN:  No current treatment recommended aside from statin as above. Encouraged continued lifestyle changes with diet, exercise, and weight loss.     Prediabetes  newly diagnosed chronic condition (has expected duration of at least one year or until the death of the patient), currently not at treatment goal  Assessment: Patient had not had  prior Hgb A1c screening completed so this was ordered with lipid panel obtained prior to appointment on 9/12, especially in setting of HLD. A1c was slightly elevated at 5.9% and in prediabetic range.   PLAN:  Reviewed results with patient and educated on significance and overall prognosis and management plan. Recommend therapeutic lifestyle changes, specifically discussed limiting carbs, processed foods, refined sugars. Will repeat labs at next appt for annual physical/AWV      Follow Up   Follow up in the next 1-2 months for annual physical/AWV as patient is overdue    An After Visit Summary was printed and given to the patient.          Data Reviewed   [x] External Notes    Cardiology note 5/15/23   [x] Test Results    ECHO results 3/30/23    Ordered Lipid Panel, Hgb A1c, and Hep C prior to appointment and obtained on 9/12      Risk   [x] Prescription Drug Management          Katty Jones MD

## 2023-09-29 PROBLEM — R73.03 PREDIABETES: Status: ACTIVE | Noted: 2023-09-14

## 2023-09-29 PROBLEM — R00.2 PALPITATIONS: Status: RESOLVED | Noted: 2023-04-16 | Resolved: 2023-09-29

## 2023-09-29 PROBLEM — R55 NEAR SYNCOPE: Status: RESOLVED | Noted: 2023-03-30 | Resolved: 2023-09-29

## 2023-09-29 PROBLEM — Z23 NEED FOR IMMUNIZATION AGAINST INFLUENZA: Status: RESOLVED | Noted: 2019-10-18 | Resolved: 2023-09-29

## 2023-09-29 PROBLEM — Z09 HOSPITAL DISCHARGE FOLLOW-UP: Status: RESOLVED | Noted: 2023-04-16 | Resolved: 2023-09-29

## 2023-09-29 PROBLEM — I50.32 CHRONIC HEART FAILURE WITH PRESERVED EJECTION FRACTION: Chronic | Status: ACTIVE | Noted: 2023-03-30

## 2023-09-29 PROBLEM — E78.00 PURE HYPERCHOLESTEROLEMIA: Chronic | Status: ACTIVE | Noted: 2022-07-07

## 2023-10-31 ENCOUNTER — TRANSCRIBE ORDERS (OUTPATIENT)
Dept: ADMINISTRATIVE | Facility: HOSPITAL | Age: 68
End: 2023-10-31
Payer: MEDICARE

## 2023-10-31 ENCOUNTER — HOSPITAL ENCOUNTER (OUTPATIENT)
Dept: CARDIOLOGY | Facility: HOSPITAL | Age: 68
Discharge: HOME | End: 2023-10-31
Payer: MEDICARE

## 2023-10-31 ENCOUNTER — LAB (OUTPATIENT)
Dept: LAB | Facility: HOSPITAL | Age: 68
End: 2023-10-31
Payer: MEDICARE

## 2023-10-31 DIAGNOSIS — H04.123 DRY EYES: ICD-10-CM

## 2023-10-31 DIAGNOSIS — H02.88A MEIBOMIAN GLAND DYSFUNCTION (MGD) OF UPPER AND LOWER LIDS OF BOTH EYES: ICD-10-CM

## 2023-10-31 DIAGNOSIS — H02.834 DERMATOCHALASIS OF LEFT UPPER EYELID: ICD-10-CM

## 2023-10-31 DIAGNOSIS — H02.421 MYOGENIC PTOSIS OF RIGHT EYELID: ICD-10-CM

## 2023-10-31 DIAGNOSIS — H02.831 DERMATOCHALASIS OF RIGHT UPPER EYELID: ICD-10-CM

## 2023-10-31 DIAGNOSIS — H02.88B MEIBOMIAN GLAND DYSFUNCTION (MGD) OF UPPER AND LOWER LIDS OF BOTH EYES: ICD-10-CM

## 2023-10-31 DIAGNOSIS — H02.422 MYOGENIC PTOSIS OF LEFT EYELID: ICD-10-CM

## 2023-10-31 DIAGNOSIS — H02.421 MYOGENIC PTOSIS OF RIGHT EYELID: Primary | ICD-10-CM

## 2023-10-31 LAB
QT INTERVAL: 370 MS
QTC INTERVAL: 408 MS

## 2023-10-31 PROCEDURE — 93005 ELECTROCARDIOGRAM TRACING: CPT | Performed by: OPHTHALMOLOGY

## 2023-10-31 PROCEDURE — 80048 BASIC METABOLIC PNL TOTAL CA: CPT

## 2023-10-31 PROCEDURE — 36415 COLL VENOUS BLD VENIPUNCTURE: CPT

## 2023-11-01 ENCOUNTER — TELEPHONE (OUTPATIENT)
Dept: FAMILY MEDICINE CLINIC | Facility: CLINIC | Age: 68
End: 2023-11-01
Payer: MEDICARE

## 2023-11-01 DIAGNOSIS — Z00.00 HEALTHCARE MAINTENANCE: Primary | ICD-10-CM

## 2023-11-01 LAB
ANION GAP SERPL CALCULATED.3IONS-SCNC: 9.1 MMOL/L (ref 5–15)
BUN SERPL-MCNC: 17 MG/DL (ref 8–23)
BUN/CREAT SERPL: 30.9 (ref 7–25)
CALCIUM SPEC-SCNC: 9.7 MG/DL (ref 8.6–10.5)
CHLORIDE SERPL-SCNC: 102 MMOL/L (ref 98–107)
CO2 SERPL-SCNC: 28.9 MMOL/L (ref 22–29)
CREAT SERPL-MCNC: 0.55 MG/DL (ref 0.57–1)
EGFRCR SERPLBLD CKD-EPI 2021: 100.6 ML/MIN/1.73
GLUCOSE SERPL-MCNC: 43 MG/DL (ref 65–99)
POTASSIUM SERPL-SCNC: 4.2 MMOL/L (ref 3.5–5.2)
SODIUM SERPL-SCNC: 140 MMOL/L (ref 136–145)

## 2023-11-01 NOTE — TELEPHONE ENCOUNTER
PATIENT IS SCHEDULED TO HAVE EYE SURGERY ON 11/9 AND THEY HAD HER COME IN AND HER BMP DRAWN AND HER NON FASTING GLUCOSE CAME BACK AT 44 AND SHE HAS NEVER HAD A BLOOD SUGAR ISSUE AS FAR AS SHE KNOWS. SHE HAS A MEDICARE WELLNESS EXAM ON 11/7 AND WOULD LIKE TO HAVE FASTING LABS DRAWN THAT DAY.

## 2023-11-07 ENCOUNTER — OFFICE VISIT (OUTPATIENT)
Dept: FAMILY MEDICINE CLINIC | Facility: CLINIC | Age: 68
End: 2023-11-07
Payer: MEDICARE

## 2023-11-07 ENCOUNTER — LAB (OUTPATIENT)
Dept: FAMILY MEDICINE CLINIC | Facility: CLINIC | Age: 68
End: 2023-11-07
Payer: MEDICARE

## 2023-11-07 VITALS
DIASTOLIC BLOOD PRESSURE: 80 MMHG | HEART RATE: 80 BPM | OXYGEN SATURATION: 99 % | WEIGHT: 145 LBS | RESPIRATION RATE: 18 BRPM | BODY MASS INDEX: 27.38 KG/M2 | SYSTOLIC BLOOD PRESSURE: 124 MMHG | HEIGHT: 61 IN

## 2023-11-07 DIAGNOSIS — Z00.00 HEALTHCARE MAINTENANCE: ICD-10-CM

## 2023-11-07 DIAGNOSIS — E78.00 PURE HYPERCHOLESTEROLEMIA: Chronic | ICD-10-CM

## 2023-11-07 DIAGNOSIS — R73.03 PREDIABETES: ICD-10-CM

## 2023-11-07 DIAGNOSIS — Z00.00 ENCOUNTER FOR ANNUAL WELLNESS VISIT (AWV) IN MEDICARE PATIENT: Primary | ICD-10-CM

## 2023-11-07 PROCEDURE — 36415 COLL VENOUS BLD VENIPUNCTURE: CPT

## 2023-11-07 PROCEDURE — 80053 COMPREHEN METABOLIC PANEL: CPT | Performed by: STUDENT IN AN ORGANIZED HEALTH CARE EDUCATION/TRAINING PROGRAM

## 2023-11-07 PROCEDURE — 83036 HEMOGLOBIN GLYCOSYLATED A1C: CPT | Performed by: STUDENT IN AN ORGANIZED HEALTH CARE EDUCATION/TRAINING PROGRAM

## 2023-11-07 PROCEDURE — 85025 COMPLETE CBC W/AUTO DIFF WBC: CPT | Performed by: STUDENT IN AN ORGANIZED HEALTH CARE EDUCATION/TRAINING PROGRAM

## 2023-11-07 NOTE — PROGRESS NOTES
The ABCs of the Annual Wellness Visit  Subsequent Medicare Wellness Visit    Subjective      Ave Hardin is a 68 y.o. female who presents for a Subsequent Medicare Wellness Visit.    The following portions of the patient's history were reviewed and   updated as appropriate: allergies, current medications, past family history, past medical history, past social history, past surgical history, and problem list.    Compared to one year ago, the patient feels her physical   health is the same.    Compared to one year ago, the patient feels her mental   health is the same.    Recent Hospitalizations:  She was admitted within the past 365 days at [facility] for palpitations in March 2023.       Current Medical Providers:  Patient Care Team:  Katty Jones MD as PCP - General (Internal Medicine)    Outpatient Medications Prior to Visit   Medication Sig Dispense Refill    rosuvastatin (CRESTOR) 10 MG tablet Take 1 tablet by mouth Every Night. 90 tablet 3     No facility-administered medications prior to visit.       No opioid medication identified on active medication list. I have reviewed chart for other potential  high risk medication/s and harmful drug interactions in the elderly.        Aspirin is not on active medication list.  Aspirin use is not indicated based on review of current medical condition/s. Risk of harm outweighs potential benefits.  .    Patient Active Problem List   Diagnosis    Weight gain    Bone spur    Screening for colon cancer    Encounter for screening mammogram for malignant neoplasm of breast    Menopause    Encounter for screening and preventative care    Vitamin D deficiency    Cervical radiculopathy    Normal pelvic exam    Osteoporosis    Thyroid nodule    Benign positional vertigo    Encounter for general adult medical examination without abnormal findings    Pure hypercholesterolemia    History of colonic polyps    Chronic heart failure with preserved ejection  "fraction    Prediabetes     Advance Care Planning   Advance Care Planning     Advance Directive is not on file.  ACP discussion was held with the patient during this visit. Patient does not have an advance directive, information provided.     Objective    Vitals:    23 1434   BP: 124/80   BP Location: Left arm   Pulse: 80   Resp: 18   SpO2: 99%   Weight: 65.8 kg (145 lb)   Height: 154.9 cm (60.98\")     Estimated body mass index is 27.41 kg/m² as calculated from the following:    Height as of this encounter: 154.9 cm (60.98\").    Weight as of this encounter: 65.8 kg (145 lb).    BMI is >= 25 and <30. (Overweight) The following options were offered after discussion;: exercise counseling/recommendations and nutrition counseling/recommendations      Does the patient have evidence of cognitive impairment?   No    Lab Results   Component Value Date    TRIG 46 2023    HDL 52 2023    LDL 68 2023    VLDL 11 2023    HGBA1C 5.70 (H) 2023          HEALTH RISK ASSESSMENT    Smoking Status:  Social History     Tobacco Use   Smoking Status Never    Passive exposure: Never   Smokeless Tobacco Never     Alcohol Consumption:  Social History     Substance and Sexual Activity   Alcohol Use Never     Fall Risk Screen:    JENNAADI Fall Risk Assessment has not been completed.    Depression Screenin/7/2023     2:33 PM   PHQ-2/PHQ-9 Depression Screening   Little Interest or Pleasure in Doing Things 0-->not at all   Feeling Down, Depressed or Hopeless 0-->not at all   PHQ-9: Brief Depression Severity Measure Score 0       Health Habits and Functional and Cognitive Screenin/7/2023     2:37 PM   Functional & Cognitive Status   Do you have difficulty preparing food and eating? No   Do you have difficulty bathing yourself, getting dressed or grooming yourself? No   Do you have difficulty using the toilet? No   Do you have difficulty moving around from place to place? No   Do you have trouble " with steps or getting out of a bed or a chair? No   Current Diet Well Balanced Diet   Dental Exam Up to date   Eye Exam Up to date   Exercise (times per week) 2 times per week   Current Exercises Include Walking   Do you need help using the phone?  No   Are you deaf or do you have serious difficulty hearing?  No   Do you need help to go to places out of walking distance? No   Do you need help shopping? No   Do you need help preparing meals?  No   Do you need help with housework?  No   Do you need help with laundry? No   Do you need help taking your medications? No   Do you need help managing money? No   Do you ever drive or ride in a car without wearing a seat belt? No   Have you felt unusual stress, anger or loneliness in the last month? No   Who do you live with? Spouse   If you need help, do you have trouble finding someone available to you? No   Have you been bothered in the last four weeks by sexual problems? No   Do you have difficulty concentrating, remembering or making decisions? No       Age-appropriate Screening Schedule:  Refer to the list below for future screening recommendations based on patient's age, sex and/or medical conditions. Orders for these recommended tests are listed in the plan section. The patient has been provided with a written plan.    Health Maintenance   Topic Date Due    TDAP/TD VACCINES (1 - Tdap) Never done    ZOSTER VACCINE (1 of 2) Never done    DXA SCAN  11/16/2022    COVID-19 Vaccine (3 - 2023-24 season) 02/15/2024 (Originally 9/1/2023)    INFLUENZA VACCINE  03/31/2024 (Originally 8/1/2023)    LIPID PANEL  09/12/2024    ANNUAL WELLNESS VISIT  11/07/2024    BMI FOLLOWUP  11/07/2024    MAMMOGRAM  12/08/2024    COLORECTAL CANCER SCREENING  12/09/2025    HEPATITIS C SCREENING  Completed    Pneumococcal Vaccine 65+  Discontinued    PAP SMEAR  Discontinued                  CMS Preventative Services Quick Reference  Risk Factors Identified During Encounter:    Immunizations  Discussed/Encouraged: Tdap, Shingrix, and COVID19    The above risks/problems have been discussed with the patient.  Pertinent information has been shared with the patient in the After Visit Summary.    Diagnoses and all orders for this visit:    1. Encounter for annual wellness visit (AWV) in Medicare patient (Primary)  Comments:  Recommended she obtain influenza, Tdap, and Shingrix at local pharmacy. Cancer screenings UTD; mammogram every 2 years, due 12/2024; colonoscopy due 12/2025. Due for DEXA, will order after upcoming eye surgery. Routine counseling and anticipatory guidance provided.    2. Prediabetes  Comments:  Therapeutic lifestyle recommendations encouraged. Repeat annually for continued monitoring.     3. Pure hypercholesterolemia  Comments:  Controlled on current medication. Continue rosuvastatin 10 mg daily.      Follow Up:   Next Medicare Wellness visit to be scheduled in 1 year.      An After Visit Summary and PPPS were made available to the patient.      Katty Jones MD

## 2023-11-08 LAB
ALBUMIN SERPL-MCNC: 4.1 G/DL (ref 3.5–5.2)
ALBUMIN/GLOB SERPL: 1.4 G/DL
ALP SERPL-CCNC: 64 U/L (ref 39–117)
ALT SERPL W P-5'-P-CCNC: 17 U/L (ref 1–33)
ANION GAP SERPL CALCULATED.3IONS-SCNC: 9.4 MMOL/L (ref 5–15)
AST SERPL-CCNC: 20 U/L (ref 1–32)
BASOPHILS # BLD AUTO: 0.02 10*3/MM3 (ref 0–0.2)
BASOPHILS NFR BLD AUTO: 0.4 % (ref 0–1.5)
BILIRUB SERPL-MCNC: 0.4 MG/DL (ref 0–1.2)
BUN SERPL-MCNC: 16 MG/DL (ref 8–23)
BUN/CREAT SERPL: 26.2 (ref 7–25)
CALCIUM SPEC-SCNC: 9.6 MG/DL (ref 8.6–10.5)
CHLORIDE SERPL-SCNC: 106 MMOL/L (ref 98–107)
CO2 SERPL-SCNC: 26.6 MMOL/L (ref 22–29)
CREAT SERPL-MCNC: 0.61 MG/DL (ref 0.57–1)
DEPRECATED RDW RBC AUTO: 41.4 FL (ref 37–54)
EGFRCR SERPLBLD CKD-EPI 2021: 98.1 ML/MIN/1.73
EOSINOPHIL # BLD AUTO: 0.09 10*3/MM3 (ref 0–0.4)
EOSINOPHIL NFR BLD AUTO: 1.8 % (ref 0.3–6.2)
ERYTHROCYTE [DISTWIDTH] IN BLOOD BY AUTOMATED COUNT: 12.6 % (ref 12.3–15.4)
GLOBULIN UR ELPH-MCNC: 2.9 GM/DL
GLUCOSE SERPL-MCNC: 97 MG/DL (ref 65–99)
HBA1C MFR BLD: 5.7 % (ref 4.8–5.6)
HCT VFR BLD AUTO: 40.8 % (ref 34–46.6)
HGB BLD-MCNC: 13.2 G/DL (ref 12–15.9)
IMM GRANULOCYTES # BLD AUTO: 0.01 10*3/MM3 (ref 0–0.05)
IMM GRANULOCYTES NFR BLD AUTO: 0.2 % (ref 0–0.5)
LYMPHOCYTES # BLD AUTO: 1.88 10*3/MM3 (ref 0.7–3.1)
LYMPHOCYTES NFR BLD AUTO: 36.8 % (ref 19.6–45.3)
MCH RBC QN AUTO: 29.3 PG (ref 26.6–33)
MCHC RBC AUTO-ENTMCNC: 32.4 G/DL (ref 31.5–35.7)
MCV RBC AUTO: 90.5 FL (ref 79–97)
MONOCYTES # BLD AUTO: 0.5 10*3/MM3 (ref 0.1–0.9)
MONOCYTES NFR BLD AUTO: 9.8 % (ref 5–12)
NEUTROPHILS NFR BLD AUTO: 2.61 10*3/MM3 (ref 1.7–7)
NEUTROPHILS NFR BLD AUTO: 51 % (ref 42.7–76)
NRBC BLD AUTO-RTO: 0.2 /100 WBC (ref 0–0.2)
PLATELET # BLD AUTO: 205 10*3/MM3 (ref 140–450)
PMV BLD AUTO: 10.6 FL (ref 6–12)
POTASSIUM SERPL-SCNC: 3.9 MMOL/L (ref 3.5–5.2)
PROT SERPL-MCNC: 7 G/DL (ref 6–8.5)
RBC # BLD AUTO: 4.51 10*6/MM3 (ref 3.77–5.28)
SODIUM SERPL-SCNC: 142 MMOL/L (ref 136–145)
WBC NRBC COR # BLD: 5.11 10*3/MM3 (ref 3.4–10.8)

## 2023-11-10 ENCOUNTER — TELEPHONE (OUTPATIENT)
Dept: FAMILY MEDICINE CLINIC | Facility: CLINIC | Age: 68
End: 2023-11-10
Payer: MEDICARE

## 2023-11-10 DIAGNOSIS — Z13.820 SCREENING FOR OSTEOPOROSIS: ICD-10-CM

## 2023-11-10 DIAGNOSIS — Z13.820 ENCOUNTER FOR SCREENING FOR OSTEOPOROSIS: ICD-10-CM

## 2023-11-10 DIAGNOSIS — M81.0 OSTEOPOROSIS, UNSPECIFIED OSTEOPOROSIS TYPE, UNSPECIFIED PATHOLOGICAL FRACTURE PRESENCE: Primary | ICD-10-CM

## 2023-11-10 NOTE — TELEPHONE ENCOUNTER
Caller: Ave Hardin    Relationship: Self    Best call back number:     [phone]        What orders are you requesting (i.e. lab or imaging): DEXA SCAN    In what timeframe would the patient need to come in:     Where will you receive your lab/imaging services: [facility]    Additional notes: NO REFERRAL  IN CHART,  FAX [phone]

## 2023-11-15 LAB
QT INTERVAL: 370 MS
QTC INTERVAL: 408 MS

## 2024-01-09 DIAGNOSIS — M81.0 OSTEOPOROSIS, UNSPECIFIED OSTEOPOROSIS TYPE, UNSPECIFIED PATHOLOGICAL FRACTURE PRESENCE: ICD-10-CM

## 2024-03-07 ENCOUNTER — TRANSCRIBE ORDERS (OUTPATIENT)
Dept: ADMINISTRATIVE | Facility: HOSPITAL | Age: 69
End: 2024-03-07
Payer: MEDICARE

## 2024-03-07 ENCOUNTER — HOSPITAL ENCOUNTER (OUTPATIENT)
Dept: CARDIOLOGY | Facility: HOSPITAL | Age: 69
Discharge: HOME | End: 2024-03-07
Payer: MEDICARE

## 2024-03-07 ENCOUNTER — LAB (OUTPATIENT)
Dept: LAB | Facility: HOSPITAL | Age: 69
End: 2024-03-07
Payer: MEDICARE

## 2024-03-07 DIAGNOSIS — H02.422 MYOGENIC PTOSIS OF LEFT EYELID: ICD-10-CM

## 2024-03-07 DIAGNOSIS — H04.123 DRY EYES: ICD-10-CM

## 2024-03-07 DIAGNOSIS — H02.413 MECHANICAL PTOSIS OF EYELID OF BOTH EYES: Primary | ICD-10-CM

## 2024-03-07 DIAGNOSIS — H02.834 DERMATOCHALASIS OF LEFT UPPER EYELID: ICD-10-CM

## 2024-03-07 DIAGNOSIS — H02.421 MYOGENIC PTOSIS OF RIGHT EYELID: ICD-10-CM

## 2024-03-07 DIAGNOSIS — H02.831 DERMATOCHALASIS OF RIGHT UPPER EYELID: ICD-10-CM

## 2024-03-07 DIAGNOSIS — H02.413 MECHANICAL PTOSIS OF EYELID OF BOTH EYES: ICD-10-CM

## 2024-03-07 DIAGNOSIS — Z41.1 ENCOUNTER FOR BREAST AUGMENTATION: ICD-10-CM

## 2024-03-07 LAB
ANION GAP SERPL CALCULATED.3IONS-SCNC: 9 MMOL/L (ref 5–15)
BUN SERPL-MCNC: 16 MG/DL (ref 8–23)
BUN/CREAT SERPL: 27.6 (ref 7–25)
CALCIUM SPEC-SCNC: 9.8 MG/DL (ref 8.6–10.5)
CHLORIDE SERPL-SCNC: 105 MMOL/L (ref 98–107)
CO2 SERPL-SCNC: 29 MMOL/L (ref 22–29)
CREAT SERPL-MCNC: 0.58 MG/DL (ref 0.57–1)
EGFRCR SERPLBLD CKD-EPI 2021: 99.3 ML/MIN/1.73
GLUCOSE SERPL-MCNC: 72 MG/DL (ref 65–99)
POTASSIUM SERPL-SCNC: 4.2 MMOL/L (ref 3.5–5.2)
SODIUM SERPL-SCNC: 143 MMOL/L (ref 136–145)

## 2024-03-07 PROCEDURE — 36415 COLL VENOUS BLD VENIPUNCTURE: CPT

## 2024-03-07 PROCEDURE — 93005 ELECTROCARDIOGRAM TRACING: CPT | Performed by: OPHTHALMOLOGY

## 2024-03-07 PROCEDURE — 80048 BASIC METABOLIC PNL TOTAL CA: CPT

## 2024-03-09 LAB
QT INTERVAL: 391 MS
QTC INTERVAL: 404 MS

## 2024-06-03 DIAGNOSIS — E78.2 MIXED HYPERLIPIDEMIA: ICD-10-CM

## 2024-06-03 RX ORDER — ROSUVASTATIN CALCIUM 10 MG/1
10 TABLET, COATED ORAL NIGHTLY
Qty: 30 TABLET | Refills: 0 | OUTPATIENT
Start: 2024-06-03

## 2024-06-03 NOTE — TELEPHONE ENCOUNTER
Left voicemail for patient to return call. Patient is needing to schedule an appointment for refills on medication.

## 2024-06-03 NOTE — TELEPHONE ENCOUNTER
Rx Refill Note  Requested Prescriptions     Pending Prescriptions Disp Refills    rosuvastatin (CRESTOR) 10 MG tablet [Pharmacy Med Name: ROSUVASTATIN CALCIUM 10 MG TAB] 30 tablet 0     Sig: TAKE ONE TABLET BY MOUTH ONCE NIGHTLY      Last office visit with prescribing clinician: 5/15/2023   Last telemedicine visit with prescribing clinician: Visit date not found   Next office visit with prescribing clinician: Visit date not found                         Would you like a call back once the refill request has been completed: [] Yes [] No    If the office needs to give you a call back, can they leave a voicemail: [] Yes [] No    Jemima Maciel MA  06/03/24, 16:38 EDT

## 2024-06-04 DIAGNOSIS — E78.2 MIXED HYPERLIPIDEMIA: ICD-10-CM

## 2024-06-04 RX ORDER — ROSUVASTATIN CALCIUM 10 MG/1
10 TABLET, COATED ORAL NIGHTLY
Qty: 90 TABLET | Refills: 3 | Status: SHIPPED | OUTPATIENT
Start: 2024-06-04

## 2024-06-04 NOTE — TELEPHONE ENCOUNTER
Caller: Ave Hardin    Relationship: Self    Best call back number: [phone]     Requested Prescriptions:   Requested Prescriptions     Pending Prescriptions Disp Refills    rosuvastatin (CRESTOR) 10 MG tablet 90 tablet 3     Sig: Take 1 tablet by mouth Every Night.        Pharmacy where request should be sent: [id number], [place] - [address] - [phone] PH - [phone] FX     Last office visit with prescribing clinician: 11/7/2023   Last telemedicine visit with prescribing clinician: Visit date not found   Next office visit with prescribing clinician: 11/8/2024     Additional details provided by patient: PATIENT HAS ABOUT A WEEK SUPPLY OF MEDICATION    PATIENT STATES THE PREVIOUS PRESCRIBING PROVIDER NEEDS DR CLEMONS TO REFILL IT BECAUSE SHE DOES NOT SEE HIM ANYMORE     Does the patient have less than a 3 day supply:  [] Yes  [x] No    Would you like a call back once the refill request has been completed: [] Yes [x] No    If the office needs to give you a call back, can they leave a voicemail: [] Yes [x] No    Kayleen Solomon Rep   06/04/24 15:43 EDT

## 2024-06-07 ENCOUNTER — TELEPHONE (OUTPATIENT)
Dept: FAMILY MEDICINE CLINIC | Facility: CLINIC | Age: 69
End: 2024-06-07

## 2024-06-07 NOTE — TELEPHONE ENCOUNTER
Caller: Nya Hardin    Relationship: Self    Best call back number: [phone]     NYA HAS HAD PAIN BEFORE IN DISKS IN SPINE. SHE WOULD LIKE TO DO ARM EXERCISES BUT DOES NOT WANT TO AGGRAVATE THE DISKS. REQUESTING A CALL WITH RECOMMENDATIONS OF EXERCISES

## 2024-06-13 RX ORDER — ERYTHROMYCIN 5 MG/G
OINTMENT OPHTHALMIC NIGHTLY
Qty: 3.5 G | Refills: 0 | Status: SHIPPED | OUTPATIENT
Start: 2024-06-13 | End: 2024-06-20

## 2024-06-13 NOTE — TELEPHONE ENCOUNTER
Caller: Ave Hardin    Relationship to patient: Self    Best call back number: [phone]    Where are you experiencing symptoms:     1)HAS 2 BULGING DISKS IN NECK AND WANTS TO DO ARM EXERCISES BUT DOES NOT WANT TO AGGRAVATE THE DISKS    2)HAS STYE IN EYE - HAS USED HOT COMPRESSES.     WOULD LIKE TO KNOW IF ERYTHROMYCIN OINTMENT CAN BE CALLED IN    If a prescription is needed, what is your preferred pharmacy:   [id number] - [place], [place] - [address] - [phone]  - [phone] FX  [address] [place] [address]  Phone: [phone] Fax: [phone]

## 2024-06-14 NOTE — TELEPHONE ENCOUNTER
Caller: Ave Hardin    Relationship: Self    Best call back number: [phone]     What was the call regarding: PATIENT KNOWS THE EYE MEDICATION WAS CALLED BUT SHE WOULD LIKE TO KNOW IF THE BULGING DISK COULD BE IDENTIFY THOSE    PLEASE CALL AND ADVISE

## 2024-06-19 ENCOUNTER — TELEPHONE (OUTPATIENT)
Dept: FAMILY MEDICINE CLINIC | Facility: CLINIC | Age: 69
End: 2024-06-19
Payer: MEDICARE

## 2024-06-19 NOTE — TELEPHONE ENCOUNTER
Tried calling pt again and had to LVM to call back.    Bexarotene Counseling:  I discussed with the patient the risks of bexarotene including but not limited to hair loss, dry lips/skin/eyes, liver abnormalities, hyperlipidemia, pancreatitis, depression/suicidal ideation, photosensitivity, drug rash/allergic reactions, hypothyroidism, anemia, leukopenia, infection, cataracts, and teratogenicity.  Patient understands that they will need regular blood tests to check lipid profile, liver function tests, white blood cell count, thyroid function tests and pregnancy test if applicable.

## 2025-06-26 ENCOUNTER — OFFICE VISIT (OUTPATIENT)
Dept: FAMILY MEDICINE CLINIC | Facility: CLINIC | Age: 70
End: 2025-06-26
Payer: MEDICARE

## 2025-06-26 ENCOUNTER — LAB (OUTPATIENT)
Dept: FAMILY MEDICINE CLINIC | Facility: CLINIC | Age: 70
End: 2025-06-26
Payer: MEDICARE

## 2025-06-26 VITALS
RESPIRATION RATE: 16 BRPM | HEART RATE: 76 BPM | DIASTOLIC BLOOD PRESSURE: 72 MMHG | WEIGHT: 153.13 LBS | OXYGEN SATURATION: 98 % | HEIGHT: 61 IN | SYSTOLIC BLOOD PRESSURE: 122 MMHG | BODY MASS INDEX: 28.91 KG/M2

## 2025-06-26 DIAGNOSIS — E55.9 VITAMIN D DEFICIENCY: ICD-10-CM

## 2025-06-26 DIAGNOSIS — Z00.00 MEDICARE ANNUAL WELLNESS VISIT, SUBSEQUENT: ICD-10-CM

## 2025-06-26 DIAGNOSIS — Z00.00 MEDICARE ANNUAL WELLNESS VISIT, SUBSEQUENT: Primary | ICD-10-CM

## 2025-06-26 DIAGNOSIS — E78.2 MIXED HYPERLIPIDEMIA: ICD-10-CM

## 2025-06-26 PROBLEM — Z12.11 SCREENING FOR COLON CANCER: Status: RESOLVED | Noted: 2020-10-10 | Resolved: 2025-06-26

## 2025-06-26 PROBLEM — E78.00 PURE HYPERCHOLESTEROLEMIA: Chronic | Status: RESOLVED | Noted: 2022-07-07 | Resolved: 2025-06-26

## 2025-06-26 LAB
ALBUMIN SERPL-MCNC: 4.3 G/DL (ref 3.5–5.2)
ALBUMIN/GLOB SERPL: 1.3 G/DL
ALP SERPL-CCNC: 67 U/L (ref 39–117)
ALT SERPL W P-5'-P-CCNC: 16 U/L (ref 1–33)
ANION GAP SERPL CALCULATED.3IONS-SCNC: 9.2 MMOL/L (ref 5–15)
AST SERPL-CCNC: 20 U/L (ref 1–32)
BASOPHILS # BLD AUTO: 0.03 10*3/MM3 (ref 0–0.2)
BASOPHILS NFR BLD AUTO: 0.5 % (ref 0–1.5)
BILIRUB SERPL-MCNC: 0.8 MG/DL (ref 0–1.2)
BUN SERPL-MCNC: 18 MG/DL (ref 8–23)
BUN/CREAT SERPL: 27.3 (ref 7–25)
CALCIUM SPEC-SCNC: 10 MG/DL (ref 8.6–10.5)
CHLORIDE SERPL-SCNC: 104 MMOL/L (ref 98–107)
CHOLEST SERPL-MCNC: 161 MG/DL (ref 0–200)
CO2 SERPL-SCNC: 26.8 MMOL/L (ref 22–29)
CREAT SERPL-MCNC: 0.66 MG/DL (ref 0.57–1)
DEPRECATED RDW RBC AUTO: 43.3 FL (ref 37–54)
EGFRCR SERPLBLD CKD-EPI 2021: 95.7 ML/MIN/1.73
EOSINOPHIL # BLD AUTO: 0.12 10*3/MM3 (ref 0–0.4)
EOSINOPHIL NFR BLD AUTO: 2 % (ref 0.3–6.2)
ERYTHROCYTE [DISTWIDTH] IN BLOOD BY AUTOMATED COUNT: 12.7 % (ref 12.3–15.4)
GLOBULIN UR ELPH-MCNC: 3.2 GM/DL
GLUCOSE SERPL-MCNC: 94 MG/DL (ref 65–99)
HCT VFR BLD AUTO: 45 % (ref 34–46.6)
HDLC SERPL-MCNC: 59 MG/DL (ref 40–60)
HGB BLD-MCNC: 14.2 G/DL (ref 12–15.9)
IMM GRANULOCYTES # BLD AUTO: 0.01 10*3/MM3 (ref 0–0.05)
IMM GRANULOCYTES NFR BLD AUTO: 0.2 % (ref 0–0.5)
LDLC SERPL CALC-MCNC: 89 MG/DL (ref 0–100)
LDLC/HDLC SERPL: 1.51 {RATIO}
LYMPHOCYTES # BLD AUTO: 2.42 10*3/MM3 (ref 0.7–3.1)
LYMPHOCYTES NFR BLD AUTO: 40.3 % (ref 19.6–45.3)
MCH RBC QN AUTO: 29.3 PG (ref 26.6–33)
MCHC RBC AUTO-ENTMCNC: 31.6 G/DL (ref 31.5–35.7)
MCV RBC AUTO: 93 FL (ref 79–97)
MONOCYTES # BLD AUTO: 0.47 10*3/MM3 (ref 0.1–0.9)
MONOCYTES NFR BLD AUTO: 7.8 % (ref 5–12)
NEUTROPHILS NFR BLD AUTO: 2.96 10*3/MM3 (ref 1.7–7)
NEUTROPHILS NFR BLD AUTO: 49.2 % (ref 42.7–76)
NRBC BLD AUTO-RTO: 0 /100 WBC (ref 0–0.2)
PLATELET # BLD AUTO: 252 10*3/MM3 (ref 140–450)
PMV BLD AUTO: 10.8 FL (ref 6–12)
POTASSIUM SERPL-SCNC: 4.2 MMOL/L (ref 3.5–5.2)
PROT SERPL-MCNC: 7.5 G/DL (ref 6–8.5)
RBC # BLD AUTO: 4.84 10*6/MM3 (ref 3.77–5.28)
SODIUM SERPL-SCNC: 140 MMOL/L (ref 136–145)
TRIGL SERPL-MCNC: 65 MG/DL (ref 0–150)
VLDLC SERPL-MCNC: 13 MG/DL (ref 5–40)
WBC NRBC COR # BLD AUTO: 6.01 10*3/MM3 (ref 3.4–10.8)

## 2025-06-26 PROCEDURE — 80061 LIPID PANEL: CPT | Performed by: STUDENT IN AN ORGANIZED HEALTH CARE EDUCATION/TRAINING PROGRAM

## 2025-06-26 PROCEDURE — 85025 COMPLETE CBC W/AUTO DIFF WBC: CPT | Performed by: STUDENT IN AN ORGANIZED HEALTH CARE EDUCATION/TRAINING PROGRAM

## 2025-06-26 PROCEDURE — 36415 COLL VENOUS BLD VENIPUNCTURE: CPT

## 2025-06-26 PROCEDURE — 82306 VITAMIN D 25 HYDROXY: CPT | Performed by: STUDENT IN AN ORGANIZED HEALTH CARE EDUCATION/TRAINING PROGRAM

## 2025-06-26 PROCEDURE — 84443 ASSAY THYROID STIM HORMONE: CPT | Performed by: STUDENT IN AN ORGANIZED HEALTH CARE EDUCATION/TRAINING PROGRAM

## 2025-06-26 PROCEDURE — 83036 HEMOGLOBIN GLYCOSYLATED A1C: CPT | Performed by: STUDENT IN AN ORGANIZED HEALTH CARE EDUCATION/TRAINING PROGRAM

## 2025-06-26 PROCEDURE — 80053 COMPREHEN METABOLIC PANEL: CPT | Performed by: STUDENT IN AN ORGANIZED HEALTH CARE EDUCATION/TRAINING PROGRAM

## 2025-06-26 RX ORDER — ROSUVASTATIN CALCIUM 10 MG/1
10 TABLET, COATED ORAL NIGHTLY
Qty: 90 TABLET | Refills: 3 | Status: SHIPPED | OUTPATIENT
Start: 2025-06-26 | End: 2025-06-30

## 2025-06-26 NOTE — ASSESSMENT & PLAN NOTE
Chronic, stable  Continue current regimen  Orders:    rosuvastatin (CRESTOR) 10 MG tablet; Take 1 tablet by mouth Every Night.

## 2025-06-26 NOTE — PROGRESS NOTES
Subjective   The ABCs of the Annual Wellness Visit  Medicare Wellness Visit      Ave Hardin is a 69 y.o. patient who presents for a Medicare Wellness Visit.    The following portions of the patient's history were reviewed and   updated as appropriate: allergies, current medications, past family history, past medical history, past social history, past surgical history, and problem list.    Compared to one year ago, the patient's physical   health is the same.  Compared to one year ago, the patient's mental   health is the same.    Recent Hospitalizations:  She was not admitted to the hospital during the last year.     Current Medical Providers:  Patient Care Team:  Katty Jones MD as PCP - General (Internal Medicine)    Outpatient Medications Prior to Visit   Medication Sig Dispense Refill    Coenzyme Q10 (COQ10 PO) Take  by mouth Daily.      MAGNESIUM PO Take  by mouth Daily.      VITAMIN D PO Take  by mouth Daily.      rosuvastatin (CRESTOR) 10 MG tablet Take 1 tablet by mouth Every Night. 90 tablet 3     No facility-administered medications prior to visit.     No opioid medication identified on active medication list. I have reviewed chart for other potential  high risk medication/s and harmful drug interactions in the elderly.      Aspirin is not on active medication list.  Aspirin use is not indicated based on review of current medical condition/s. Risk of harm outweighs potential benefits.  .    Patient Active Problem List   Diagnosis    Weight gain    Bone spur    Screening for colon cancer    Encounter for screening mammogram for malignant neoplasm of breast    Menopause    Encounter for screening and preventative care    Vitamin D deficiency    Cervical radiculopathy    Normal pelvic exam    Osteoporosis    Thyroid nodule    Benign positional vertigo    Encounter for general adult medical examination without abnormal findings    Pure hypercholesterolemia    History of colonic polyps     "Chronic heart failure with preserved ejection fraction    Prediabetes     Advance Care Planning Advance Directive is not on file.  ACP discussion was held with the patient during this visit. Patient does not have an advance directive, declines further assistance.            Objective   Vitals:    25 1410   Resp: 16   Weight: 69.5 kg (153 lb 2 oz)   Height: 154.9 cm (61\")   PainSc: 0-No pain       Estimated body mass index is 28.93 kg/m² as calculated from the following:    Height as of this encounter: 154.9 cm (61\").    Weight as of this encounter: 69.5 kg (153 lb 2 oz).    BMI is >= 25 and <30. (Overweight) The following options were offered after discussion;: exercise counseling/recommendations and nutrition counseling/recommendations           Does the patient have evidence of cognitive impairment? No                                                                                                Health  Risk Assessment    Smoking Status:  Social History     Tobacco Use   Smoking Status Never    Passive exposure: Never   Smokeless Tobacco Never     Alcohol Consumption:  Social History     Substance and Sexual Activity   Alcohol Use Never       Fall Risk Screen  STEADI Fall Risk Assessment was completed, and patient is at LOW risk for falls.Assessment completed on:2025    Depression Screening   Little interest or pleasure in doing things? Not at all   Feeling down, depressed, or hopeless? Not at all   PHQ-2 Total Score 0      Health Habits and Functional and Cognitive Screenin/24/2025     7:55 PM   Functional & Cognitive Status   Do you have difficulty preparing food and eating? No   Do you have difficulty bathing yourself, getting dressed or grooming yourself? No   Do you have difficulty using the toilet? No   Do you have difficulty moving around from place to place? No   Do you have trouble with steps or getting out of a bed or a chair? No   Current Diet Well Balanced Diet   Dental Exam Up to " date   Eye Exam Up to date   Exercise (times per week) 1 times per week   Current Exercises Include House Cleaning;Walking   Do you need help using the phone?  No   Are you deaf or do you have serious difficulty hearing?  Yes   Do you need help to go to places out of walking distance? No   Do you need help shopping? No   Do you need help preparing meals?  No   Do you need help with housework?  No   Do you need help with laundry? No   Do you need help taking your medications? No   Do you need help managing money? No   Do you ever drive or ride in a car without wearing a seat belt? No   Have you felt unusual fatigue (could be tiredness), stress, anger or loneliness in the last month? No   Who do you live with? Spouse   If you need help, do you have trouble finding someone available to you? No   Have you been bothered in the last four weeks by sexual problems? No   Do you have difficulty concentrating, remembering or making decisions? No           Age-appropriate Screening Schedule:  Refer to the list below for future screening recommendations based on patient's age, sex and/or medical conditions. Orders for these recommended tests are listed in the plan section. The patient has been provided with a written plan.    Health Maintenance List  Health Maintenance   Topic Date Due    TDAP/TD VACCINES (1 - Tdap) Never done    ZOSTER VACCINE (1 of 2) Never done    COVID-19 Vaccine (3 - 2024-25 season) 09/01/2024    LIPID PANEL  09/12/2024    ANNUAL WELLNESS VISIT  11/07/2024    COLORECTAL CANCER SCREENING  12/09/2025    INFLUENZA VACCINE  07/01/2025    MAMMOGRAM  01/05/2026    DXA SCAN  01/05/2026    HEPATITIS C SCREENING  Completed    Pneumococcal Vaccine 50+  Discontinued                                                                                                                                                CMS Preventative Services Quick Reference  Risk Factors Identified During Encounter  None Identified    The above  risks/problems have been discussed with the patient.  Pertinent information has been shared with the patient in the After Visit Summary.  An After Visit Summary and PPPS were made available to the patient.    Follow Up:   Next Medicare Wellness visit to be scheduled in 1 year.         Additional E&M Note during same encounter follows:  Patient has additional, significant, and separately identifiable condition(s)/problem(s) that require work above and beyond the Medicare Wellness Visit     Chief Complaint  Medicare Wellness-subsequent    Subjective   HPI  Ave is also being seen today for an annual adult preventative physical exam.  and Ave is also being seen today for additional medical problem/s.          HLD: Rosuvastatin 10 mg daily    Colonoscopy due this year, will schedule  Mammogram also due, at [place]  DEXA osteopenia in 11/2023      Shingrix due and will get at local pharmacy    Wants to go to  for     Trigger finger L 4th digit and this concerns her about exercising    Hearing Loss: saw audiologist; does not have otosclerosis (like mother)   - going back to get hearing retested   R ear wax impacted    Strong family hx of alzheimer's    History of Present Illness  The patient presents for evaluation of hypercholesterolemia, trigger finger, snoring, and health maintenance.    Hypercholesterolemia  - The primary reason for this visit is to address her cholesterol management and overall health maintenance.  - She has been managing her cholesterol levels with daily medication, although she occasionally forgets a dose.  - She also takes a vitamin D supplement earlier in the day due to potential interactions with her statin medication.  - She is uncertain about the current status of her cholesterol levels as she has not undergone recent blood tests.    Health Maintenance  - She is due for a colonoscopy this year and has received the necessary paperwork.  - Additionally, she is due for a mammogram  "and requires a referral for the same.  - She is considering getting a shingles vaccine, having experienced shingles during her pregnancy in 1988.  - She maintains a healthy diet, including dairy products, and consumes at least one protein drink daily.  - She is contemplating hiring a  to ensure correct exercise techniques.  - She has regained some weight but is planning to resume her weight loss efforts.    Musculoskeletal Issues  - She has a history of two bulging discs, which have caused significant discomfort in the past.  - She experiences pain in her wrist when performing certain activities, such as opening a jar.  - She also reports mild pain in her palm, which she attributes to phone use.  - She is concerned about exacerbating these conditions through exercise.    Trigger Finger  - She has been experiencing trigger finger for several years, which occasionally requires injections for relief.    Otosclerosis  - She has been diagnosed with otosclerosis and has significant hearing loss in both ears.  - She has been using ear wax removal products but continues to experience difficulty with ear wax buildup.    Cognitive Health  - She has a family history of Alzheimer's disease and is concerned about her cognitive health.  - She is considering taking Prevagen as a preventive measure.    Snoring  - She has been experiencing snoring for several years, which is not severe but is intermittent.  - She does not experience daytime headaches or fatigue upon waking.  - She has a long-standing issue with insomnia, which she believes is due to racing thoughts.  - She does not follow a strict sleep schedule and often stays up late.    Supplemental information: None    FAMILY HISTORY  - Mother, father, grandmother, aunt, and great-grandfather had Alzheimer's         Objective   Vital Signs:  /72   Pulse 76   Resp 16   Ht 154.9 cm (61\")   Wt 69.5 kg (153 lb 2 oz)   SpO2 98%   BMI 28.93 kg/m² "     Constitutional: Alert, well-appearing, no acute distress  HENT: NCAT, mucous membranes moist, bilateral TM normal, oropharynx normal  Neck: Supple, no thyromegaly, no lymphadenopathy  Respiratory: No respiratory distress, good effort and air entry, clear to auscultation bilaterally   Cardiovascular: RRR, no LE edema  Neuro: No gross focal deficits, normal gait  Psychiatric: Appropriate mood and affect, cooperative  Skin: No apparent rashes or lesions         Assessment and Plan      Medicare annual wellness visit, subsequent  Age appropriate cancer and preventative screenings were reviewed and ordered as indicated. Immunizations were reviewed and education/instructions were provided on any recommended vaccines that are currently due. Counseled patient on diet, exercise, weight, vaccines, disease/screening prevention, safety, risk reduction, dental/vision care, and mental health. Addressed all patient/family questions.  - Advised to schedule next year's appointment and a lab appointment a few days prior to the visit.  - Bone scan to be repeated next year along with the mammogram.  - Advised to get the shingles vaccine, with the second dose administered two months after the first.  Orders:    CBC Auto Differential; Future    Comprehensive Metabolic Panel; Future    Lipid Panel; Future    Hemoglobin A1c; Future    TSH Rfx On Abnormal To Free T4; Future    Mixed hyperlipidemia  Chronic, stable  Continue current regimen  Orders:    rosuvastatin (CRESTOR) 10 MG tablet; Take 1 tablet by mouth Every Night.    Vitamin D deficiency  Check Vitamin D levels. Continue supplementation.   Orders:    Vitamin D,25-Hydroxy; Future            Follow Up   Return in about 1 year (around 6/26/2026) for Medicare Wellness.    Patient was given instructions and counseling regarding her condition or for health maintenance advice. Please see specific information pulled into the AVS if appropriate.

## 2025-06-27 LAB
25(OH)D3 SERPL-MCNC: 47.4 NG/ML (ref 30–100)
HBA1C MFR BLD: 6.1 % (ref 4.8–5.6)
TSH SERPL DL<=0.05 MIU/L-ACNC: 2.42 UIU/ML (ref 0.27–4.2)

## 2025-06-30 DIAGNOSIS — E78.2 MIXED HYPERLIPIDEMIA: ICD-10-CM

## 2025-06-30 RX ORDER — ROSUVASTATIN CALCIUM 10 MG/1
10 TABLET, COATED ORAL NIGHTLY
Qty: 90 TABLET | Refills: 3 | Status: SHIPPED | OUTPATIENT
Start: 2025-06-30

## 2025-07-14 ENCOUNTER — PATIENT MESSAGE (OUTPATIENT)
Dept: FAMILY MEDICINE CLINIC | Facility: CLINIC | Age: 70
End: 2025-07-14
Payer: MEDICARE

## 2025-07-14 DIAGNOSIS — R73.03 PREDIABETES: Primary | ICD-10-CM

## 2025-08-04 ENCOUNTER — TELEPHONE (OUTPATIENT)
Dept: FAMILY MEDICINE CLINIC | Facility: CLINIC | Age: 70
End: 2025-08-04
Payer: MEDICARE

## 2025-08-04 RX ORDER — ERYTHROMYCIN 5 MG/G
OINTMENT OPHTHALMIC NIGHTLY
Qty: 3.5 G | Refills: 0 | Status: SHIPPED | OUTPATIENT
Start: 2025-08-04 | End: 2025-08-11

## 2025-08-05 DIAGNOSIS — Z12.31 SCREENING MAMMOGRAM FOR BREAST CANCER: Primary | ICD-10-CM

## 2025-08-06 DIAGNOSIS — Z12.31 SCREENING MAMMOGRAM FOR BREAST CANCER: ICD-10-CM
